# Patient Record
Sex: FEMALE | Race: WHITE | NOT HISPANIC OR LATINO | Employment: FULL TIME | URBAN - METROPOLITAN AREA
[De-identification: names, ages, dates, MRNs, and addresses within clinical notes are randomized per-mention and may not be internally consistent; named-entity substitution may affect disease eponyms.]

---

## 2017-02-14 ENCOUNTER — TRANSFERRED RECORDS (OUTPATIENT)
Dept: HEALTH INFORMATION MANAGEMENT | Facility: CLINIC | Age: 22
End: 2017-02-14

## 2017-02-14 LAB — PAP-ABSTRACT: NORMAL

## 2019-05-29 ENCOUNTER — OFFICE VISIT (OUTPATIENT)
Dept: FAMILY MEDICINE | Facility: CLINIC | Age: 24
End: 2019-05-29
Payer: COMMERCIAL

## 2019-05-29 VITALS
RESPIRATION RATE: 16 BRPM | SYSTOLIC BLOOD PRESSURE: 136 MMHG | DIASTOLIC BLOOD PRESSURE: 77 MMHG | OXYGEN SATURATION: 100 % | BODY MASS INDEX: 28.5 KG/M2 | HEIGHT: 69 IN | WEIGHT: 192.44 LBS | TEMPERATURE: 98.6 F | HEART RATE: 88 BPM

## 2019-05-29 DIAGNOSIS — Z00.00 ENCOUNTER FOR ROUTINE ADULT HEALTH EXAMINATION WITHOUT ABNORMAL FINDINGS: Primary | ICD-10-CM

## 2019-05-29 DIAGNOSIS — R23.2 FLUSHING: ICD-10-CM

## 2019-05-29 DIAGNOSIS — Z30.41 ENCOUNTER FOR SURVEILLANCE OF CONTRACEPTIVE PILLS: ICD-10-CM

## 2019-05-29 PROCEDURE — 87591 N.GONORRHOEAE DNA AMP PROB: CPT | Performed by: FAMILY MEDICINE

## 2019-05-29 PROCEDURE — G0145 SCR C/V CYTO,THINLAYER,RESCR: HCPCS | Performed by: FAMILY MEDICINE

## 2019-05-29 PROCEDURE — 99385 PREV VISIT NEW AGE 18-39: CPT | Performed by: FAMILY MEDICINE

## 2019-05-29 PROCEDURE — 87491 CHLMYD TRACH DNA AMP PROBE: CPT | Performed by: FAMILY MEDICINE

## 2019-05-29 RX ORDER — DROSPIRENONE AND ETHINYL ESTRADIOL 0.02-3(28)
1 KIT ORAL DAILY
Qty: 84 TABLET | Refills: 3 | Status: SHIPPED | OUTPATIENT
Start: 2019-05-29 | End: 2020-04-16

## 2019-05-29 RX ORDER — DESOGESTREL AND ETHINYL ESTRADIOL 0.15-0.03
1 KIT ORAL
COMMUNITY
Start: 2018-07-23 | End: 2019-05-29

## 2019-05-29 RX ORDER — PROPRANOLOL HYDROCHLORIDE 20 MG/1
20 TABLET ORAL 3 TIMES DAILY
COMMUNITY
End: 2019-05-29

## 2019-05-29 RX ORDER — PROPRANOLOL HYDROCHLORIDE 20 MG/1
20 TABLET ORAL 3 TIMES DAILY PRN
Qty: 30 TABLET | Refills: 5 | Status: SHIPPED | OUTPATIENT
Start: 2019-05-29 | End: 2019-09-30

## 2019-05-29 RX ORDER — GLYCOPYRROLATE 1 MG/1
1 TABLET ORAL 3 TIMES DAILY
COMMUNITY
End: 2020-08-18

## 2019-05-29 SDOH — HEALTH STABILITY: MENTAL HEALTH: HOW OFTEN DO YOU HAVE 6 OR MORE DRINKS ON ONE OCCASION?: NEVER

## 2019-05-29 SDOH — HEALTH STABILITY: MENTAL HEALTH: HOW MANY STANDARD DRINKS CONTAINING ALCOHOL DO YOU HAVE ON A TYPICAL DAY?: 1 OR 2

## 2019-05-29 SDOH — HEALTH STABILITY: MENTAL HEALTH: HOW OFTEN DO YOU HAVE A DRINK CONTAINING ALCOHOL?: 2-3 TIMES A WEEK

## 2019-05-29 SDOH — HEALTH STABILITY: MENTAL HEALTH: HOW MANY DRINKS CONTAINING ALCOHOL DO YOU HAVE ON A TYPICAL DAY WHEN YOU ARE DRINKING?: 1 OR 2

## 2019-05-29 SDOH — HEALTH STABILITY: MENTAL HEALTH: HOW OFTEN DO YOU HAVE SIX OR MORE DRINKS ON ONE OCCASION?: NEVER

## 2019-05-29 ASSESSMENT — MIFFLIN-ST. JEOR: SCORE: 1681.23

## 2019-05-29 ASSESSMENT — PAIN SCALES - GENERAL: PAINLEVEL: NO PAIN (0)

## 2019-05-29 NOTE — NURSING NOTE
"Chief Complaint   Patient presents with     Physical     Temp 98.6  F (37  C) (Oral)   Resp 16   Ht 1.759 m (5' 9.25\")   Wt 87.3 kg (192 lb 7 oz)   LMP 05/08/2019 (Approximate)   SpO2 100%   Breastfeeding? No   BMI 28.21 kg/m   Estimated body mass index is 28.21 kg/m  as calculated from the following:    Height as of this encounter: 1.759 m (5' 9.25\").    Weight as of this encounter: 87.3 kg (192 lb 7 oz).  bp completed using cuff size: regular      Health Maintenance addressed:  PAP done at [facility]- seen through care everywhere   Sent to abstract  All.THIERRY Tapia    n/a              "

## 2019-05-29 NOTE — PROGRESS NOTES
SUBJECTIVE:   CC: Benita Robbins is an 24 year old woman who presents for preventive health visit.     Healthy Habits:     Getting at least 3 servings of Calcium per day:  Yes    Bi-annual eye exam:  Yes    Dental care twice a year:  Yes    Sleep apnea or symptoms of sleep apnea:  None    Diet:  Gluten-free/reduced    Frequency of exercise:  6-7 days/week    Duration of exercise:  Greater than 60 minutes    Taking medications regularly:  Yes    Medication side effects:  None    PHQ-2 Total Score: 0    Additional concerns today:  Yes          -------------------------------------    Today's PHQ-2 Score:   PHQ-2 ( 1999 [employer]) 5/29/2019   Q1: Little interest or pleasure in doing things 0   Q2: Feeling down, depressed or hopeless 0   PHQ-2 Score 0   Q1: Little interest or pleasure in doing things Not at all   Q2: Feeling down, depressed or hopeless Not at all   PHQ-2 Score 0       Abuse: Current or Past(Physical, Sexual or Emotional)- No  Do you feel safe in your environment? Yes    Social History     Tobacco Use     Smoking status: Never Smoker     Smokeless tobacco: Never Used   Substance Use Topics     Alcohol use: Yes     Frequency: 2-3 times a week     Drinks per session: 1 or 2     Binge frequency: Never     Comment: soc- wine     If you drink alcohol do you typically have >3 drinks per day or >7 drinks per week? No    Alcohol Use 5/29/2019   Prescreen: >3 drinks/day or >7 drinks/week? No   Prescreen: >3 drinks/day or >7 drinks/week? -   No flowsheet data found.    Reviewed orders with patient.  Reviewed health maintenance and updated orders accordingly - Yes  There is no problem list on file for this patient.    Past Surgical History:   Procedure Laterality Date     DENTAL SURGERY      Fabius teeth extraction       Social History     Tobacco Use     Smoking status: Never Smoker     Smokeless tobacco: Never Used   Substance Use Topics     Alcohol use: Yes     Frequency: 2-3 times a week     Drinks per  "session: 1 or 2     Binge frequency: Never     Comment: soc- wine     Family History   Problem Relation Age of Onset     Alzheimer Disease Paternal Grandmother      Alcoholism Paternal Grandmother      Depression Paternal Grandmother      Mental Illness Paternal Grandmother      Coronary Artery Disease Paternal Grandfather      Alcoholism Paternal Grandfather            Mammogram not appropriate for this patient based on age.    Pertinent mammograms are reviewed under the imaging tab.  History of abnormal Pap smear: NO - age 21-29 PAP every 3 years recommended     Reviewed and updated as needed this visit by clinical staff  Tobacco  Allergies  Meds  Problems  Med Hx  Surg Hx  Fam Hx  Soc Hx          Reviewed and updated as needed this visit by Provider  Tobacco  Allergies  Meds  Problems  Med Hx  Surg Hx  Fam Hx            Review of Systems  CONSTITUTIONAL: NEGATIVE for fever, chills, change in weight  INTEGUMENTARU/SKIN: NEGATIVE for worrisome rashes, moles or lesions  EYES: NEGATIVE for vision changes or irritation  ENT: NEGATIVE for ear, mouth and throat problems  RESP: NEGATIVE for significant cough or SOB  BREAST: NEGATIVE for masses, tenderness or discharge  CV: NEGATIVE for chest pain, palpitations or peripheral edema  GI: NEGATIVE for nausea, abdominal pain, heartburn, or change in bowel habits  : NEGATIVE for unusual urinary or vaginal symptoms. Periods are regular.  MUSCULOSKELETAL: NEGATIVE for significant arthralgias or myalgia  NEURO: NEGATIVE for weakness, dizziness or paresthesias  PSYCHIATRIC: NEGATIVE for changes in mood or affect     OBJECTIVE:   /77 (BP Location: Right arm, Patient Position: Sitting, Cuff Size: Adult Regular)   Pulse 88   Temp 98.6  F (37  C) (Oral)   Resp 16   Ht 1.759 m (5' 9.25\")   Wt 87.3 kg (192 lb 7 oz)   LMP 05/08/2019 (Approximate)   SpO2 100%   Breastfeeding? No   BMI 28.21 kg/m    Physical Exam  GENERAL: healthy, alert and no " distress  EYES: Eyes grossly normal to inspection, PERRL and conjunctivae and sclerae normal  HENT: ear canals and TM's normal, nose and mouth without ulcers or lesions  NECK: no adenopathy, no asymmetry, masses, or scars and thyroid normal to palpation  RESP: lungs clear to auscultation - no rales, rhonchi or wheezes  BREAST: normal without masses, tenderness or nipple discharge and no palpable axillary masses or adenopathy  CV: regular rate and rhythm, normal S1 S2, no S3 or S4, no murmur, click or rub, no peripheral edema and peripheral pulses strong  ABDOMEN: soft, nontender, no hepatosplenomegaly, no masses and bowel sounds normal   (female): normal female external genitalia, normal urethral meatus, vaginal mucosa pink, moist, well rugated, and normal cervix/adnexa/uterus without masses or discharge  MS: no gross musculoskeletal defects noted, no edema  SKIN: no suspicious lesions or rashes  NEURO: Normal strength and tone, mentation intact and speech normal  PSYCH: mentation appears normal, affect normal/bright    Diagnostic Test Results:  none     ASSESSMENT/PLAN:   1. Encounter for routine adult health examination without abnormal findings  Routine screening  - drospirenone-ethinyl estradiol (MELISSA) 3-0.02 MG tablet; Take 1 tablet by mouth daily  Dispense: 84 tablet; Refill: 3  - Pap imaged thin layer screen reflex to HPV if ASCUS - recommend age 25 - 29  - NEISSERIA GONORRHOEA PCR  - CHLAMYDIA TRACHOMATIS PCR    2. Encounter for surveillance of contraceptive pills  Will switch her OCP -   When she picks up the OCP it is expensive  Plan to try Melissa generic  If not working will have her call to get previous OCP    3. Flushing  Will fill the inderal - uses just prn for flushing  - propranolol (INDERAL) 20 MG tablet; Take 1 tablet (20 mg) by mouth 3 times daily as needed (flushing)  Dispense: 30 tablet; Refill: 5    COUNSELING:  Reviewed preventive health counseling, as reflected in patient  "instructions    Estimated body mass index is 28.21 kg/m  as calculated from the following:    Height as of this encounter: 1.759 m (5' 9.25\").    Weight as of this encounter: 87.3 kg (192 lb 7 oz).    Weight management plan: Discussed healthy diet and exercise guidelines     reports that she has never smoked. She has never used smokeless tobacco.      Counseling Resources:  ATP IV Guidelines  Pooled Cohorts Equation Calculator  Breast Cancer Risk Calculator  FRAX Risk Assessment  ICSI Preventive Guidelines  Dietary Guidelines for Americans, 2010  USDA's MyPlate  ASA Prophylaxis  Lung CA Screening    Gin Anne, DO  [facility]  "

## 2019-05-31 LAB
C TRACH DNA SPEC QL NAA+PROBE: NEGATIVE
N GONORRHOEA DNA SPEC QL NAA+PROBE: NEGATIVE
SPECIMEN SOURCE: NORMAL
SPECIMEN SOURCE: NORMAL

## 2019-05-31 NOTE — RESULT ENCOUNTER NOTE
Dear Benita,   Your test results are all back -   -Chlamydia and gonnohrea tests are normal.  Let us know if you have any questions.  -Gin Anne, DO

## 2019-06-03 LAB
COPATH REPORT: NORMAL
PAP: NORMAL

## 2019-06-24 ENCOUNTER — E-VISIT (OUTPATIENT)
Dept: FAMILY MEDICINE | Facility: CLINIC | Age: 24
End: 2019-06-24
Payer: COMMERCIAL

## 2019-06-24 DIAGNOSIS — B37.31 CANDIDAL VULVOVAGINITIS: Primary | ICD-10-CM

## 2019-06-24 PROCEDURE — 99444 ZZC PHYSICIAN ONLINE EVALUATION & MANAGEMENT SERVICE: CPT | Performed by: FAMILY MEDICINE

## 2019-06-25 RX ORDER — FLUCONAZOLE 150 MG/1
150 TABLET ORAL ONCE
Qty: 1 TABLET | Refills: 0 | Status: SHIPPED | OUTPATIENT
Start: 2019-06-25 | End: 2019-06-25

## 2019-06-25 NOTE — PATIENT INSTRUCTIONS
Thank you for choosing us for your care. I have placed an order for a prescription so that you can start treatment. View your full visit summary for details by clicking on the link below. Your pharmacist will able to address any questions you may have about the medication.     If you re not feeling better within 2-3 days, please schedule an appointment.  You can schedule an appointment right here in [employer], or call [phone]  If the visit is for the same symptoms as your e-visit, we ll refund the cost of your e-visit if seen within seven days.      Yeast Infection (Candida Vaginal Infection)    You have a Candida vaginal infection. This is also known as a yeast infection. It is most often caused by a type of yeast (fungus) called Candida. Candida are normally found in the vagina. But if they increase in number, this can lead to infection and cause symptoms.  Symptoms of a yeast infection can include:    Clumpy or thin, white discharge, which may look like cottage cheese    Itching or burning    Burning with urination  Certain factors can make a yeast infection more likely. These can include:    Taking certain medicines, such as antibiotics or birth control pills    Pregnancy    Diabetes    Weak immune system  A yeast infection is most often treated with antifungal medicine. This may be given as a vaginal cream or pills you take by mouth. Treatment may last for about 1 to 7 days. Women with severe or recurrent infections may need longer courses of treatment.  Home care    If you re prescribed medicine, be sure to use it as directed. Finish all of the medicine, even if your symptoms go away. Note: Don t try to treat yourself using over-the-counter products without talking to your provider first. He or she will let you know if this is a good option for you.    Ask your provider what steps you can take to help reduce your risk of having a yeast infection in the future.  Follow-up care  Follow up with your  healthcare provider, or as directed.  When to seek medical advice  Call your healthcare provider right away if:    You have a fever of 100.4 F (38 C) or higher, or as directed by your provider.    Your symptoms worsen, or they don t go away within a few days of starting treatment.    You have new pain in the lower belly or pelvic region.    You have side effects that bother you or a reaction to the cream or pills you re prescribed.    You or any partners you have sex with have new symptoms, such as a rash, joint pain, or sores.  Date Last Reviewed: 10/1/2017    4005-5582 [employer]. [address] [place] [address]. All rights reserved. This information is not intended as a substitute for professional medical care. Always follow your healthcare professional's instructions.

## 2019-07-15 ENCOUNTER — E-VISIT (OUTPATIENT)
Dept: FAMILY MEDICINE | Facility: CLINIC | Age: 24
End: 2019-07-15
Payer: COMMERCIAL

## 2019-07-15 DIAGNOSIS — L30.9 DERMATITIS: Primary | ICD-10-CM

## 2019-07-15 PROCEDURE — 99444 ZZC PHYSICIAN ONLINE EVALUATION & MANAGEMENT SERVICE: CPT | Performed by: FAMILY MEDICINE

## 2019-07-15 RX ORDER — FLUOCINONIDE CREAM (EMULSIFIED BASE) 0.5 MG/G
CREAM TOPICAL
Qty: 30 G | Refills: 0 | Status: SHIPPED | OUTPATIENT
Start: 2019-07-15 | End: 2019-11-07

## 2019-08-28 ENCOUNTER — E-VISIT (OUTPATIENT)
Dept: FAMILY MEDICINE | Facility: CLINIC | Age: 24
End: 2019-08-28
Payer: COMMERCIAL

## 2019-08-28 DIAGNOSIS — L03.019 PARONYCHIA OF FINGER, UNSPECIFIED LATERALITY: Primary | ICD-10-CM

## 2019-08-28 PROCEDURE — 99444 ZZC PHYSICIAN ONLINE EVALUATION & MANAGEMENT SERVICE: CPT | Performed by: FAMILY MEDICINE

## 2019-08-28 RX ORDER — MUPIROCIN 20 MG/G
OINTMENT TOPICAL 3 TIMES DAILY
Qty: 30 G | Refills: 0 | Status: SHIPPED | OUTPATIENT
Start: 2019-08-28 | End: 2019-11-22

## 2019-08-28 RX ORDER — AMOXICILLIN AND CLAVULANATE POTASSIUM 500; 125 MG/1; MG/1
1 TABLET, FILM COATED ORAL 2 TIMES DAILY
Qty: 14 TABLET | Refills: 0 | Status: SHIPPED | OUTPATIENT
Start: 2019-08-28 | End: 2019-11-22

## 2019-08-28 NOTE — PATIENT INSTRUCTIONS
Patient Education     Paronychia of the Finger or Toe  Paronychia is an infection near a fingernail or toenail. It usually occurs when an opening in the cuticle or an ingrown toenail lets bacteria under the skin.  The infection will need to be drained if pus is present. If the infection has been caught early, you may need only antibiotic treatment. Healing will take about 1 to 2 weeks.  Home care  Follow these guidelines when caring for yourself at home:    Clean and soak the toe or finger. Do this 2 times a day for the first 3 days. To do so:  ? Soak your foot or hand in a tub of warm water for 5 minutes. Or hold your toe or finger under a faucet of warm running water for 5 minutes.  ? Clean any crust away with soap and water using a cotton swab.  ? Put antibiotic ointment on the infected area.    Change the dressing daily or any time it gets dirty.    If you were given antibiotics, take them as directed until they are all gone.    If your infection is on a toe, wear comfortable shoes with a lot of toe room. You can also wear open-toed sandals while your toe heals.    You may use over-the-counter medicine (acetaminophen or ibuprofen to help with pain, unless another medicine was prescribed. If you have chronic liver or kidney disease, talk with your healthcare provider before using these medicines. Also talk with your provider if you've had a stomach ulcer or GI (gastrointestinal) bleeding.  Prevention  The following can prevent paronychia:    Avoid cutting or playing with your cuticles at home.    Don't bite your nails.    Don't suck on your thumbs or fingers.  Follow-up care  Follow up with your healthcare provider, or as advised.  When to seek medical advice  Call your healthcare provider right away if any of these occur:    Redness, pain, or swelling of the finger or toe gets worse    Red streaks in the skin leading away from the wound    Pus or fluid draining from the nail area    Fever of 100.4 F (38 C) or  higher, or as directed by your provider  Date Last Reviewed: 8/1/2016 2000-2018 [employer]. [address] [place] [address], [place] [address]. All rights reserved. This information is not intended as a substitute for professional medical care. Always follow your healthcare professional's instructions.

## 2019-09-25 ENCOUNTER — E-VISIT (OUTPATIENT)
Dept: FAMILY MEDICINE | Facility: CLINIC | Age: 24
End: 2019-09-25

## 2019-09-25 DIAGNOSIS — N39.0 ACUTE UTI (URINARY TRACT INFECTION): Primary | ICD-10-CM

## 2019-09-25 PROCEDURE — 99444 ZZC PHYSICIAN ONLINE EVALUATION & MANAGEMENT SERVICE: CPT | Performed by: FAMILY MEDICINE

## 2019-09-25 RX ORDER — SULFAMETHOXAZOLE/TRIMETHOPRIM 800-160 MG
1 TABLET ORAL 2 TIMES DAILY
Qty: 6 TABLET | Refills: 0 | Status: SHIPPED | OUTPATIENT
Start: 2019-09-25 | End: 2019-09-28

## 2019-09-25 NOTE — PATIENT INSTRUCTIONS
Thank you for choosing us for your care. I have placed an order for a prescription so that you can start treatment. View your full visit summary for details by clicking on the link below. Your pharmacist will able to address any questions you may have about the medication.     If you re not feeling better within 2-3 days, please schedule an appointment.  You can schedule an appointment right here in [employer], or call [phone]  If the visit is for the same symptoms as your e-visit, we ll refund the cost of your e-visit if seen within seven days.      Urinary Tract Infections in Women    Urinary tract infections (UTIs) are most often caused by bacteria. These bacteria enter the urinary tract. The bacteria may come from outside the body. Or they may travel from the skin outside the rectum or vagina into the urethra. Female anatomy makes it easy for bacteria from the bowel to enter a woman s urinary tract, which is the most common source of UTI. This means women develop UTIs more often than men. Pain in or around the urinary tract is a common UTI symptom. But the only way to know for sure if you have a UTI for the healthcare provider to test your urine. The two tests that may be done are the urinalysis and urine culture.  Types of UTIs    Cystitis. A bladder infection (cystitis) is the most common UTI in women. You may have urgent or frequent urination. You may also have pain, burning when you urinate, and bloody urine.    Urethritis. This is an inflamed urethra, which is the tube that carries urine from the bladder to outside the body. You may have lower stomach or back pain. You may also have urgent or frequent urination.    Pyelonephritis. This is a kidney infection. If not treated, it can be serious and damage your kidneys. In severe cases, you may need to stay in the hospital. You may have a fever and lower back pain.  Medicines to treat a UTI  Most UTIs are treated with antibiotics. These kill the bacteria.  The length of time you need to take them depends on the type of infection. It may be as short as 3 days. If you have repeated UTIs, you may need a low-dose antibiotic for several months. Take antibiotics exactly as directed. Don t stop taking them until all of the medicine is gone. If you stop taking the antibiotic too soon, the infection may not go away. You may also develop a resistance to the antibiotic. This can make it much harder to treat.  Lifestyle changes to treat and prevent UTIs  The lifestyle changes below will help get rid of your UTI. They may also help prevent future UTIs.    Drink plenty of fluids. This includes water, juice, or other caffeine-free drinks. Fluids help flush bacteria out of your body.    Empty your bladder. Always empty your bladder when you feel the urge to urinate. And always urinate before going to sleep. Urine that stays in your bladder can lead to infection. Try to urinate before and after sex as well.    Practice good personal hygiene. Wipe yourself from front to back after using the toilet. This helps keep bacteria from getting into the urethra.    Use condoms during sex. These help prevent UTIs caused by sexually transmitted bacteria. Also don't use spermicides during sex. These can increase the risk for UTIs. Choose other forms of birth control instead. For women who tend to get UTIs after sex, a low-dose of a preventive antibiotic may be used. Be sure to discuss this option with your healthcare provider.    Follow up with your healthcare provider as directed. He or she may test to make sure the infection has cleared. If needed, more treatment may be started.  Date Last Reviewed: 1/1/2017 2000-2018 [employer]. [address] [place] [address], [place] [address]. All rights reserved. This information is not intended as a substitute for professional medical care. Always follow your healthcare professional's instructions.

## 2019-09-30 DIAGNOSIS — R23.2 FLUSHING: ICD-10-CM

## 2019-09-30 NOTE — TELEPHONE ENCOUNTER
"Routing refill request to provider for review/approval because:  Drug not on the [facility] refill protocol for dx of flushing  Mona CRAFT RN    Last Written Prescription Date:  5/29/2019  Last Fill Quantity: 30,  # refills: 5   Last office visit: 5/29/2019 with prescribing provider:     Future Office Visit:   Next 5 appointments (look out 90 days)    Oct 29, 2019  7:45 AM CDT  Office Visit with Gin Anne DO  [facility] ([facility] [address] [id number] [phone]         Requested Prescriptions   Pending Prescriptions Disp Refills     propranolol (INDERAL) 20 MG tablet [Pharmacy Med Name: PROPRANOLOL 20MG TABLETS] 30 tablet 0     Sig: TAKE 1 TABLET BY MOUTH THREE TIMES DAILY AS NEEDED(FLUSHING)       Beta-Blockers Protocol Passed - 9/30/2019  8:57 AM        Passed - Blood pressure under 140/90 in past 12 months     BP Readings from Last 3 Encounters:   05/29/19 136/77                 Passed - Patient is age 6 or older        Passed - Recent (12 mo) or future (30 days) visit within the authorizing provider's specialty     Patient has had an office visit with the authorizing provider or a provider within the authorizing providers department within the previous 12 mos or has a future within next 30 days. See \"Patient Info\" tab in inbasket, or \"Choose Columns\" in Meds & Orders section of the refill encounter.              Passed - Medication is active on med list        "

## 2019-10-01 RX ORDER — PROPRANOLOL HYDROCHLORIDE 20 MG/1
TABLET ORAL
Qty: 30 TABLET | Refills: 0 | Status: SHIPPED | OUTPATIENT
Start: 2019-10-01 | End: 2019-10-08

## 2019-10-31 DIAGNOSIS — R23.2 FLUSHING: ICD-10-CM

## 2019-10-31 RX ORDER — PROPRANOLOL HYDROCHLORIDE 20 MG/1
TABLET ORAL
Qty: 30 TABLET | Refills: 0 | Status: SHIPPED | OUTPATIENT
Start: 2019-10-31 | End: 2020-08-18 | Stop reason: DRUGHIGH

## 2019-10-31 NOTE — TELEPHONE ENCOUNTER
"Inderal  Last Written Prescription Date:    Last Fill Quantity: 30,  # refills: 0   Last office visit: 5/29/2019 with prescribing provider:  yes   Future Office Visit:   Next 5 appointments (look out 90 days)    Nov 22, 2019  3:45 PM CST  Office Visit with Gin Anne DO  [facility] ([facility]) [address]  [facility] [address] [phone]         Requested Prescriptions   Pending Prescriptions Disp Refills     propranolol (INDERAL) 20 MG tablet [Pharmacy Med Name: PROPRANOLOL 20MG TABLETS] 30 tablet 0     Sig: TAKE 1 TABLET BY MOUTH THREE TIMES DAILY AS NEEDED(FLUSHING)       Beta-Blockers Protocol Passed - 10/31/2019  8:49 AM        Passed - Blood pressure under 140/90 in past 12 months     BP Readings from Last 3 Encounters:   05/29/19 136/77                 Passed - Patient is age 6 or older        Passed - Recent (12 mo) or future (30 days) visit within the authorizing provider's specialty     Patient has had an office visit with the authorizing provider or a provider within the authorizing providers department within the previous 12 mos or has a future within next 30 days. See \"Patient Info\" tab in inbasket, or \"Choose Columns\" in Meds & Orders section of the refill encounter.              Passed - Medication is active on med list          "

## 2019-10-31 NOTE — TELEPHONE ENCOUNTER
Prescription approved per [facility] Refill Protocol.  Mona CRAFT RN

## 2019-11-07 DIAGNOSIS — L30.9 DERMATITIS: ICD-10-CM

## 2019-11-08 RX ORDER — FLUOCINONIDE CREAM (EMULSIFIED BASE) 0.5 MG/G
CREAM TOPICAL
Qty: 30 G | Refills: 0 | Status: SHIPPED | OUTPATIENT
Start: 2019-11-08 | End: 2020-08-18

## 2019-11-08 NOTE — TELEPHONE ENCOUNTER
Routing refill request to provider for review/approval because:  Drug not on the [facility] refill protocol   Mona CRAFT RN

## 2019-11-08 NOTE — TELEPHONE ENCOUNTER
Last Written Prescription Date:  7/15/2019  Last Fill Quantity: 30,  # refills: 0   Last office visit: 5/29/2019 with prescribing provider:  Dayana   Future Office Visit:   Next 5 appointments (look out 90 days)    Nov 22, 2019  3:45 PM CST  Office Visit with Gin Anne DO  [facility] ([facility]) [address] [facility] [address] [phone]           Requested Prescriptions   Pending Prescriptions Disp Refills     fluocinonide emulsified base 0.05 % external cream [Pharmacy Med Name: FLUOCINONIDE -E 0.05% CREAM 15GM] 30 g 0     Sig: APPLY EXTERNALLY TO RASH TWICE DAILY       There is no refill protocol information for this order

## 2019-11-22 ENCOUNTER — OFFICE VISIT (OUTPATIENT)
Dept: FAMILY MEDICINE | Facility: CLINIC | Age: 24
End: 2019-11-22
Payer: COMMERCIAL

## 2019-11-22 VITALS
BODY MASS INDEX: 29.64 KG/M2 | WEIGHT: 200.1 LBS | SYSTOLIC BLOOD PRESSURE: 130 MMHG | TEMPERATURE: 98 F | OXYGEN SATURATION: 97 % | HEART RATE: 91 BPM | HEIGHT: 69 IN | RESPIRATION RATE: 12 BRPM | DIASTOLIC BLOOD PRESSURE: 85 MMHG

## 2019-11-22 DIAGNOSIS — Z23 FLU VACCINE NEED: ICD-10-CM

## 2019-11-22 DIAGNOSIS — F41.1 GENERALIZED ANXIETY DISORDER: Primary | ICD-10-CM

## 2019-11-22 DIAGNOSIS — R23.2 FLUSHING: ICD-10-CM

## 2019-11-22 PROCEDURE — 90686 IIV4 VACC NO PRSV 0.5 ML IM: CPT | Performed by: FAMILY MEDICINE

## 2019-11-22 PROCEDURE — 90471 IMMUNIZATION ADMIN: CPT | Performed by: FAMILY MEDICINE

## 2019-11-22 PROCEDURE — 96127 BRIEF EMOTIONAL/BEHAV ASSMT: CPT | Performed by: FAMILY MEDICINE

## 2019-11-22 PROCEDURE — 99214 OFFICE O/P EST MOD 30 MIN: CPT | Mod: 25 | Performed by: FAMILY MEDICINE

## 2019-11-22 RX ORDER — ESCITALOPRAM OXALATE 10 MG/1
TABLET ORAL
Qty: 30 TABLET | Refills: 1 | Status: SHIPPED | OUTPATIENT
Start: 2019-11-22 | End: 2020-01-03

## 2019-11-22 RX ORDER — PROPRANOLOL HYDROCHLORIDE 40 MG/1
40 TABLET ORAL 2 TIMES DAILY PRN
Qty: 40 TABLET | Refills: 3 | Status: SHIPPED | OUTPATIENT
Start: 2019-11-22 | End: 2021-05-12

## 2019-11-22 ASSESSMENT — ANXIETY QUESTIONNAIRES
6. BECOMING EASILY ANNOYED OR IRRITABLE: SEVERAL DAYS
2. NOT BEING ABLE TO STOP OR CONTROL WORRYING: NEARLY EVERY DAY
7. FEELING AFRAID AS IF SOMETHING AWFUL MIGHT HAPPEN: MORE THAN HALF THE DAYS
5. BEING SO RESTLESS THAT IT IS HARD TO SIT STILL: NOT AT ALL
IF YOU CHECKED OFF ANY PROBLEMS ON THIS QUESTIONNAIRE, HOW DIFFICULT HAVE THESE PROBLEMS MADE IT FOR YOU TO DO YOUR WORK, TAKE CARE OF THINGS AT HOME, OR GET ALONG WITH OTHER PEOPLE: VERY DIFFICULT
GAD7 TOTAL SCORE: 12
3. WORRYING TOO MUCH ABOUT DIFFERENT THINGS: MORE THAN HALF THE DAYS
1. FEELING NERVOUS, ANXIOUS, OR ON EDGE: NEARLY EVERY DAY

## 2019-11-22 ASSESSMENT — PATIENT HEALTH QUESTIONNAIRE - PHQ9: 5. POOR APPETITE OR OVEREATING: SEVERAL DAYS

## 2019-11-22 ASSESSMENT — MIFFLIN-ST. JEOR: SCORE: 1715.99

## 2019-11-22 NOTE — NURSING NOTE
"Chief Complaint   Patient presents with     Anxiety     /85   Pulse 91   Temp 98  F (36.7  C) (Oral)   Resp 12   Ht 1.759 m (5' 9.25\")   Wt 90.8 kg (200 lb 1.6 oz)   LMP 11/08/2019 (Approximate)   SpO2 97%   BMI 29.34 kg/m   Estimated body mass index is 29.34 kg/m  as calculated from the following:    Height as of this encounter: 1.759 m (5' 9.25\").    Weight as of this encounter: 90.8 kg (200 lb 1.6 oz).  bp completed using cuff size: regular      Health Maintenance addressed:  NONE    n/a    Tete Ponce MA    "

## 2019-11-22 NOTE — PROGRESS NOTES
Subjective     Benita Robbins is a 24 year old female who presents to clinic today for the following health issues:    HPI   Anxiety Follow-Up    How are you doing with your anxiety since your last visit? Worsened     Are you having other symptoms that might be associated with anxiety? Yes:  drowsiness    Have you had a significant life event? new Job Concerns     Are you feeling depressed? No    Do you have any concerns with your use of alcohol or other drugs? No     Working with therapist - difficult to get to appts    In AM mind over thinks - ruminates  Seems more related to just work  Family hx of anxiety - mother     Taking propranolol 40mg BID most work days -   Only as needed -      Social History     Tobacco Use     Smoking status: Never Smoker     Smokeless tobacco: Never Used   Substance Use Topics     Alcohol use: Yes     Frequency: 2-3 times a week     Drinks per session: 1 or 2     Binge frequency: Never     Comment: soc- wine     Drug use: Never     MOUNA-7 SCORE 11/22/2019   Total Score 12     No flowsheet data found.  MOUNA-7  11/22/2019   1. Feeling nervous, anxious, or on edge 3   2. Not being able to stop or control worrying 3   3. Worrying too much about different things 2   4. Trouble relaxing 1   5. Being so restless that it is hard to sit still 0   6. Becoming easily annoyed or irritable 1   7. Feeling afraid, as if something awful might happen 2   MOUNA-7 Total Score 12   If you checked any problems, how difficult have they made it for you to do your work, take care of things at home, or get along with other people? Very difficult         How many servings of fruits and vegetables do you eat daily?  2-3    On average, how many sweetened beverages do you drink each day (soda, juice, sweet tea, etc)?   0-1  How many days per week do you miss taking your medication? Patient takes Meds as needed    What makes it hard for you to take your medications?   "none    -------------------------------------    There is no problem list on file for this patient.    Past Surgical History:   Procedure Laterality Date     DENTAL SURGERY      [place] teeth extraction       Social History     Tobacco Use     Smoking status: Never Smoker     Smokeless tobacco: Never Used   Substance Use Topics     Alcohol use: Yes     Frequency: 2-3 times a week     Drinks per session: 1 or 2     Binge frequency: Never     Comment: soc- wine     Family History   Problem Relation Age of Onset     Alzheimer Disease Paternal Grandmother      Alcoholism Paternal Grandmother      Depression Paternal Grandmother      Mental Illness Paternal Grandmother      Coronary Artery Disease Paternal Grandfather      Alcoholism Paternal Grandfather            Reviewed and updated as needed this visit by Provider  Tobacco  Allergies  Meds  Problems  Med Hx  Surg Hx  Fam Hx         Review of Systems   ROS COMP: Constitutional, HEENT, cardiovascular, pulmonary, GI, , musculoskeletal, neuro, skin, endocrine and psych systems are negative, except as otherwise noted.      Objective    /85   Pulse 91   Temp 98  F (36.7  C) (Oral)   Resp 12   Ht 1.759 m (5' 9.25\")   Wt 90.8 kg (200 lb 1.6 oz)   LMP 11/08/2019 (Approximate)   SpO2 97%   BMI 29.34 kg/m    Body mass index is 29.34 kg/m .  Physical Exam   GENERAL: healthy, alert and no distress  NECK: no adenopathy, no asymmetry, masses, or scars and thyroid normal to palpation  RESP: lungs clear to auscultation - no rales, rhonchi or wheezes  CV: regular rate and rhythm, normal S1 S2, no S3 or S4, no murmur, click or rub, no peripheral edema and peripheral pulses strong    Diagnostic Test Results:  Labs reviewed in Epic        Assessment & Plan     1. Generalized anxiety disorder  Worsening general anxiety   Mostly work week related -  Discussed starting daily med- risk vs benefits  Will try lexapro and see how she does  Pt will call or RTC if symptoms " "worsen or do not improve.  F/u e visit in 4-6 weeks  - escitalopram (LEXAPRO) 10 MG tablet; Start 1/2 tablet daily for 1-2 weeks, then take one tablet daily  Dispense: 30 tablet; Refill: 1    2. [place]  Refilled for prn flushing with anxiety  - propranolol (INDERAL) 40 MG tablet; Take 1 tablet (40 mg) by mouth 2 times daily as needed (anxiety)  Dispense: 40 tablet; Refill: 3    3. Flu vaccine need  Given   -  FLU VAC PRESRV FREE QUAD SPLIT VIR > 6 MONTHS IM     BMI:   Estimated body mass index is 29.34 kg/m  as calculated from the following:    Height as of this encounter: 1.759 m (5' 9.25\").    Weight as of this encounter: 90.8 kg (200 lb 1.6 oz).           Pt will call or RTC if symptoms worsen or do not improve.      Return in about 4 weeks (around 12/20/2019) for evisit for anxiety .    Gin Anne,   [facility]    "

## 2019-11-23 ASSESSMENT — ANXIETY QUESTIONNAIRES: GAD7 TOTAL SCORE: 12

## 2020-01-02 ENCOUNTER — E-VISIT (OUTPATIENT)
Dept: FAMILY MEDICINE | Facility: CLINIC | Age: 25
End: 2020-01-02
Payer: COMMERCIAL

## 2020-01-02 DIAGNOSIS — F41.1 GENERALIZED ANXIETY DISORDER: ICD-10-CM

## 2020-01-02 PROCEDURE — 99421 OL DIG E/M SVC 5-10 MIN: CPT | Performed by: FAMILY MEDICINE

## 2020-01-02 ASSESSMENT — ANXIETY QUESTIONNAIRES
GAD7 TOTAL SCORE: 7
1. FEELING NERVOUS, ANXIOUS, OR ON EDGE: SEVERAL DAYS
3. WORRYING TOO MUCH ABOUT DIFFERENT THINGS: SEVERAL DAYS
5. BEING SO RESTLESS THAT IT IS HARD TO SIT STILL: SEVERAL DAYS
4. TROUBLE RELAXING: SEVERAL DAYS
7. FEELING AFRAID AS IF SOMETHING AWFUL MIGHT HAPPEN: SEVERAL DAYS
GAD7 TOTAL SCORE: 7
2. NOT BEING ABLE TO STOP OR CONTROL WORRYING: SEVERAL DAYS
7. FEELING AFRAID AS IF SOMETHING AWFUL MIGHT HAPPEN: SEVERAL DAYS
6. BECOMING EASILY ANNOYED OR IRRITABLE: SEVERAL DAYS

## 2020-01-03 RX ORDER — ESCITALOPRAM OXALATE 10 MG/1
TABLET ORAL
Qty: 60 TABLET | Refills: 1 | Status: SHIPPED | OUTPATIENT
Start: 2020-01-03 | End: 2020-03-17

## 2020-01-03 ASSESSMENT — ANXIETY QUESTIONNAIRES: GAD7 TOTAL SCORE: 7

## 2020-03-16 ENCOUNTER — E-VISIT (OUTPATIENT)
Dept: FAMILY MEDICINE | Facility: CLINIC | Age: 25
End: 2020-03-16
Payer: COMMERCIAL

## 2020-03-16 DIAGNOSIS — B00.1 COLD SORE: Primary | ICD-10-CM

## 2020-03-16 DIAGNOSIS — F41.1 GENERALIZED ANXIETY DISORDER: ICD-10-CM

## 2020-03-16 PROCEDURE — 99422 OL DIG E/M SVC 11-20 MIN: CPT | Performed by: FAMILY MEDICINE

## 2020-03-16 RX ORDER — VALACYCLOVIR HYDROCHLORIDE 1 G/1
2000 TABLET, FILM COATED ORAL 2 TIMES DAILY
Qty: 4 TABLET | Refills: 5 | Status: SHIPPED | OUTPATIENT
Start: 2020-03-16 | End: 2021-05-11

## 2020-03-17 RX ORDER — ESCITALOPRAM OXALATE 20 MG/1
20 TABLET ORAL DAILY
Qty: 90 TABLET | Refills: 1 | Status: SHIPPED | OUTPATIENT
Start: 2020-03-17 | End: 2020-08-18

## 2020-03-29 ENCOUNTER — E-VISIT (OUTPATIENT)
Dept: FAMILY MEDICINE | Facility: CLINIC | Age: 25
End: 2020-03-29
Payer: COMMERCIAL

## 2020-03-29 DIAGNOSIS — L30.9 DERMATITIS: Primary | ICD-10-CM

## 2020-03-29 PROCEDURE — 99207 ZZC NON-BILLABLE SERV PER CHARTING: CPT | Performed by: FAMILY MEDICINE

## 2020-04-07 ENCOUNTER — E-VISIT (OUTPATIENT)
Dept: FAMILY MEDICINE | Facility: CLINIC | Age: 25
End: 2020-04-07
Payer: COMMERCIAL

## 2020-04-07 ENCOUNTER — MYC MEDICAL ADVICE (OUTPATIENT)
Dept: FAMILY MEDICINE | Facility: CLINIC | Age: 25
End: 2020-04-07

## 2020-04-07 DIAGNOSIS — J32.9 SINUSITIS, UNSPECIFIED CHRONICITY, UNSPECIFIED LOCATION: Primary | ICD-10-CM

## 2020-04-07 PROCEDURE — 99421 OL DIG E/M SVC 5-10 MIN: CPT | Performed by: FAMILY MEDICINE

## 2020-04-08 RX ORDER — AMOXICILLIN 875 MG
875 TABLET ORAL 2 TIMES DAILY
Qty: 20 TABLET | Refills: 0 | Status: SHIPPED | OUTPATIENT
Start: 2020-04-08 | End: 2020-05-15

## 2020-04-08 NOTE — PATIENT INSTRUCTIONS
Thank you for choosing us for your care. I have placed an order for a prescription so that you can start treatment. View your full visit summary for details by clicking on the link below. Your pharmacist will able to address any questions you may have about the medication.     If you're not feeling better within 5-7 days, please schedule an appointment.  You can schedule an appointment right here in [facility], or call [phone]  If the visit is for the same symptoms as your e-visit, we'll refund the cost of your e-visit if seen within seven days.      Sinusitis (Antibiotic Treatment)    The sinuses are air-filled spaces within the bones of the face. They connect to the inside of the nose. Sinusitis is an inflammation of the tissue that lines the sinuses. Sinusitis can occur during a cold. It can also happen due to allergies to pollens and other particles in the air. Sinusitis can cause symptoms of sinus congestion and a feeling of fullness. A sinus infection causes fever, headache, and facial pain. There is often green or yellow fluid draining from the nose or into the back of the throat (post-nasal drip). You have been given antibiotics to treat this condition.  Home care    Take the full course of antibiotics as instructed. Do not stop taking them, even when you feel better.    Drink plenty of water, hot tea, and other liquids. This may help thin nasal mucus. It also may help your sinuses drain fluids.    Heat may help soothe painful areas of your face. Use a towel soaked in hot water. Or,  the shower and direct the warm spray onto your face. Using a vaporizer along with a menthol rub at night may also help soothe symptoms.     An expectorant with guaifenesin may help thin nasal mucus and help your sinuses drain fluids.    You can use an over-the-counter decongestant, unless a similar medicine was prescribed to you. Nasal sprays work the fastest. Use one that contains phenylephrine or oxymetazoline.  First blow your nose gently. Then use the spray. Do not use these medicines more often than directed on the label. If you do, your symptoms may get worse. You may also take pills that contain pseudoephedrine. Don t use products that combine multiple medicines. This is because side effects may be increased. Read labels. You can also ask the pharmacist for help. (People with high blood pressure should not use decongestants. They can raise blood pressure.)    Over-the-counter antihistamines may help if allergies contributed to your sinusitis.      Do not use nasal rinses or irrigation during an acute sinus infection, unless your healthcare provider tells you to. Rinsing may spread the infection to other areas in your sinuses.    Use acetaminophen or ibuprofen to control pain, unless another pain medicine was prescribed to you. If you have chronic liver or kidney disease or ever had a stomach ulcer, talk with your healthcare provider before using these medicines. (Aspirin should never be taken by anyone under age 18 who is ill with a fever. It may cause severe liver damage.)    Don't smoke. This can make symptoms worse.  Follow-up care  Follow up with your healthcare provider or our staff if you are not better in 1 week.  When to seek medical advice  Call your healthcare provider if any of these occur:    Facial pain or headache that gets worse    Stiff neck    Unusual drowsiness or confusion    Swelling of your forehead or eyelids    Vision problems, such as blurred or double vision    Fever of 100.4 F (38 C) or higher, or as directed by your healthcare provider    Seizure    Breathing problems    Symptoms don't go away in 10 days  Prevention  Here are steps you can take to help prevent an infection:    Keep good hand washing habits.    Don t have close contact with people who have sore throats, colds, or other upper respiratory infections.    Don t smoke, and stay away from secondhand smoke.    Stay up to date with of  your vaccines.  Date Last Reviewed: 11/1/2017 2000-2019 [employer]. [address] [place] [address], [place] [address]. All rights reserved. This information is not intended as a substitute for professional medical care. Always follow your healthcare professional's instructions.

## 2020-04-16 DIAGNOSIS — Z00.00 ENCOUNTER FOR ROUTINE ADULT HEALTH EXAMINATION WITHOUT ABNORMAL FINDINGS: ICD-10-CM

## 2020-04-16 RX ORDER — DROSPIRENONE AND ETHINYL ESTRADIOL 0.02-3(28)
1 KIT ORAL DAILY
Qty: 84 TABLET | Refills: 0 | Status: SHIPPED | OUTPATIENT
Start: 2020-04-16 | End: 2020-07-10

## 2020-04-16 NOTE — TELEPHONE ENCOUNTER
"Requested Prescriptions   Pending Prescriptions Disp Refills     drospirenone-ethinyl estradiol (MELISSA) 3-0.02 MG tablet [Pharmacy Med Name: DROSPIRENONE/ETHY EST 3/0.02MG T 28]  Last Written Prescription Date:  5/29/2019  Last Fill Quantity: 84 tablet,  # refills: 3   Last office visit: 11/22/2019 with prescribing provider:  Dayana   Future Office Visit:   Next 5 appointments (look out 90 days)    Jun 03, 2020  3:45 PM CDT  MyChart Physical Adult with Gin Anne DO  [facility] [address] [facility] [address] [phone]          84 tablet 3     Sig: TAKE 1 TABLET BY MOUTH DAILY       Contraceptives Protocol Passed - 4/16/2020  9:50 AM        Passed - Patient is not a current smoker if age is 35 or older        Passed - Recent (12 mo) or future (30 days) visit within the authorizing provider's specialty     Patient has had an office visit with the authorizing provider or a provider within the authorizing providers department within the previous 12 mos or has a future within next 30 days. See \"Patient Info\" tab in inbasket, or \"Choose Columns\" in Meds & Orders section of the refill encounter.              Passed - Medication is active on med list        Passed - No active pregnancy on record        Passed - No positive pregnancy test in past 12 months              "

## 2020-04-16 NOTE — TELEPHONE ENCOUNTER
Prescription approved per [facility] [place] [facility] [place].  Mona CRAFT RN

## 2020-05-15 ENCOUNTER — VIRTUAL VISIT (OUTPATIENT)
Dept: FAMILY MEDICINE | Facility: CLINIC | Age: 25
End: 2020-05-15
Payer: COMMERCIAL

## 2020-05-15 DIAGNOSIS — F41.1 GENERALIZED ANXIETY DISORDER: Primary | ICD-10-CM

## 2020-05-15 PROCEDURE — 99214 OFFICE O/P EST MOD 30 MIN: CPT | Mod: 95 | Performed by: FAMILY MEDICINE

## 2020-05-15 RX ORDER — BUPROPION HYDROCHLORIDE 100 MG/1
TABLET, EXTENDED RELEASE ORAL
Qty: 60 TABLET | Refills: 1 | Status: SHIPPED | OUTPATIENT
Start: 2020-05-15 | End: 2020-08-18

## 2020-05-15 ASSESSMENT — ANXIETY QUESTIONNAIRES
3. WORRYING TOO MUCH ABOUT DIFFERENT THINGS: NEARLY EVERY DAY
5. BEING SO RESTLESS THAT IT IS HARD TO SIT STILL: NEARLY EVERY DAY
7. FEELING AFRAID AS IF SOMETHING AWFUL MIGHT HAPPEN: SEVERAL DAYS
1. FEELING NERVOUS, ANXIOUS, OR ON EDGE: NEARLY EVERY DAY
GAD7 TOTAL SCORE: 19
6. BECOMING EASILY ANNOYED OR IRRITABLE: NEARLY EVERY DAY
IF YOU CHECKED OFF ANY PROBLEMS ON THIS QUESTIONNAIRE, HOW DIFFICULT HAVE THESE PROBLEMS MADE IT FOR YOU TO DO YOUR WORK, TAKE CARE OF THINGS AT HOME, OR GET ALONG WITH OTHER PEOPLE: EXTREMELY DIFFICULT
2. NOT BEING ABLE TO STOP OR CONTROL WORRYING: NEARLY EVERY DAY

## 2020-05-15 ASSESSMENT — PATIENT HEALTH QUESTIONNAIRE - PHQ9
SUM OF ALL RESPONSES TO PHQ QUESTIONS 1-9: 14
5. POOR APPETITE OR OVEREATING: NEARLY EVERY DAY

## 2020-05-15 NOTE — NURSING NOTE
"Chief Complaint   Patient presents with     Anxiety     initial There were no vitals taken for this visit. Estimated body mass index is 29.34 kg/m  as calculated from the following:    Height as of 11/22/19: 1.759 m (5' 9.25\").    Weight as of 11/22/19: 90.8 kg (200 lb 1.6 oz).  BP completed using cuff size: NA (Not Taken).  L  R arm      Health Maintenance that is potentially due pending provider review:    Bonilla Sanchez ma    "

## 2020-05-15 NOTE — PROGRESS NOTES
"Benita Robbins is a 25 year old female who is being evaluated via a billable video visit.      The patient has been notified of following:     \"This video visit will be conducted via a call between you and your physician/provider. We have found that certain health care needs can be provided without the need for an in-person physical exam.  This service lets us provide the care you need with a video conversation.  If a prescription is necessary we can send it directly to your pharmacy.  If lab work is needed we can place an order for that and you can then stop by our lab to have the test done at a later time.    Video visits are billed at different rates depending on your insurance coverage.  Please reach out to your insurance provider with any questions.    If during the course of the call the physician/provider feels a video visit is not appropriate, you will not be charged for this service.\"    Patient has given verbal consent for Video visit? Yes    How would you like to obtain your AVS? CharlieStarr    Patient would like the video invitation sent by: Text to cell phone: [phone]    Will anyone else be joining your video visit? No     Subjective     Benita Robbins is a 25 year old female who presents today via video visit for the following health issues:    HPI  Anxiety Follow-Up    How are you doing with your anxiety since your last visit?     Are you having other symptoms that might be associated with anxiety?     Have you had a significant life event?      Are you feeling depressed?     Do you have any concerns with your use of alcohol or other drugs? No    Social History     Tobacco Use     Smoking status: Never Smoker     Smokeless tobacco: Never Used   Substance Use Topics     Alcohol use: Yes     Frequency: 2-3 times a week     Drinks per session: 1 or 2     Binge frequency: Never     Comment: soc- wine     Drug use: Never     MOUNA-7 SCORE 11/22/2019 1/2/2020   Total Score - 7 (mild anxiety)   Total " Score 12 7     No flowsheet data found.  Last PHQ-9 5/15/2020   1.  Little interest or pleasure in doing things 1   2.  Feeling down, depressed, or hopeless 1   3.  Trouble falling or staying asleep, or sleeping too much 3   4.  Feeling tired or having little energy 1   5.  Poor appetite or overeating 1   6.  Feeling bad about yourself 1   7.  Trouble concentrating 3   8.  Moving slowly or restless 3   Q9: Thoughts of better off dead/self-harm past 2 weeks 0   PHQ-9 Total Score 14     MOUNA-7  5/15/2020   1. Feeling nervous, anxious, or on edge 3   2. Not being able to stop or control worrying 3   3. Worrying too much about different things 3   4. Trouble relaxing 3   5. Being so restless that it is hard to sit still 3   6. Becoming easily annoyed or irritable 3   7. Feeling afraid, as if something awful might happen 1   MOUNA-7 Total Score 19   If you checked any problems, how difficult have they made it for you to do your work, take care of things at home, or get along with other people? Extremely difficult         How many days per week do you exercise enough to make your heart beat faster? 7    How many minutes a day do you exercise enough to make your heart beat faster? 30 - 60    How many days per week do you miss taking your medication? 0        Video Start Time: 11:01am    -------------------------------------    There is no problem list on file for this patient.    Past Surgical History:   Procedure Laterality Date     DENTAL SURGERY      [place] teeth extraction       Social History     Tobacco Use     Smoking status: Never Smoker     Smokeless tobacco: Never Used   Substance Use Topics     Alcohol use: Yes     Frequency: 2-3 times a week     Drinks per session: 1 or 2     Binge frequency: Never     Comment: soc- wine     Family History   Problem Relation Age of Onset     Alzheimer Disease Paternal Grandmother      Alcoholism Paternal Grandmother      Depression Paternal Grandmother      Mental Illness Paternal  "Grandmother      Coronary Artery Disease Paternal Grandfather      Alcoholism Paternal Grandfather            Reviewed and updated as needed this visit by Provider         Review of Systems   Constitutional, HEENT, cardiovascular, pulmonary, gi and gu systems are negative, except as otherwise noted.      Objective    There were no vitals taken for this visit.  Estimated body mass index is 29.34 kg/m  as calculated from the following:    Height as of 11/22/19: 1.759 m (5' 9.25\").    Weight as of 11/22/19: 90.8 kg (200 lb 1.6 oz).  Physical Exam     GENERAL: Healthy, alert and no distress  EYES: Eyes grossly normal to inspection.  No discharge or erythema, or obvious scleral/conjunctival abnormalities.  RESP: No audible wheeze, cough, or visible cyanosis.  No visible retractions or increased work of breathing.    SKIN: Visible skin clear. No significant rash, abnormal pigmentation or lesions.  NEURO: Cranial nerves grossly intact.  Mentation and speech appropriate for age.  PSYCH: Mentation appears normal, affect normal/bright, judgement and insight intact, normal speech and appearance well-groomed.      Diagnostic Test Results:  Labs reviewed in Epic        Assessment & Plan     1. Generalized anxiety disorder  Worsening anxiety - trouble with focus  Discussed option of keeping the escitalopram going at the current dose   Will add wellbutrin once daily for a week then go up to one twice a day  May take 3-5 weeks to see max benefit  Pt will call or RTC if symptoms worsen or do not improve.   - buPROPion (WELLBUTRIN SR) 100 MG 12 hr tablet; Start one tablet daily for 1 week then increase to one tablet twice day  Dispense: 60 tablet; Refill: 1      Pt will call or RTC if symptoms worsen or do not improve.      No follow-ups on file.    Gin Anne DO  [facility]      Video-Visit Details    Type of service:  Video Visit    Video End Time:11:10    Originating Location (pt. Location): Home    Distant " Location (provider location):  [facility]     Platform used for Video Visit: Aly    No follow-ups on file.       Gin Anne, DO

## 2020-05-16 ASSESSMENT — ANXIETY QUESTIONNAIRES: GAD7 TOTAL SCORE: 19

## 2020-07-09 DIAGNOSIS — Z00.00 ENCOUNTER FOR ROUTINE ADULT HEALTH EXAMINATION WITHOUT ABNORMAL FINDINGS: ICD-10-CM

## 2020-07-10 RX ORDER — DROSPIRENONE AND ETHINYL ESTRADIOL 0.02-3(28)
1 KIT ORAL DAILY
Qty: 84 TABLET | Refills: 0 | Status: CANCELLED | OUTPATIENT
Start: 2020-07-10

## 2020-07-10 RX ORDER — DROSPIRENONE AND ETHINYL ESTRADIOL 0.02-3(28)
1 KIT ORAL DAILY
Qty: 84 TABLET | Refills: 0 | Status: SHIPPED | OUTPATIENT
Start: 2020-07-10 | End: 2020-07-13

## 2020-07-10 NOTE — TELEPHONE ENCOUNTER
Routing refill request to provider for review/approval because:  Pt due for physical although had a pap 5/29/19 and due for next 5/29/22.  Did you want pt to come in for physical now or wait until later date.  Please authorize if appropriate.  Thanks,  Marissa Brown RN

## 2020-08-18 ENCOUNTER — OFFICE VISIT (OUTPATIENT)
Dept: FAMILY MEDICINE | Facility: CLINIC | Age: 25
End: 2020-08-18
Payer: COMMERCIAL

## 2020-08-18 VITALS
OXYGEN SATURATION: 98 % | BODY MASS INDEX: 31.28 KG/M2 | DIASTOLIC BLOOD PRESSURE: 75 MMHG | SYSTOLIC BLOOD PRESSURE: 117 MMHG | TEMPERATURE: 98 F | WEIGHT: 211.2 LBS | HEART RATE: 86 BPM | HEIGHT: 69 IN

## 2020-08-18 DIAGNOSIS — F41.1 GENERALIZED ANXIETY DISORDER: ICD-10-CM

## 2020-08-18 DIAGNOSIS — Z00.00 ROUTINE GENERAL MEDICAL EXAMINATION AT A HEALTH CARE FACILITY: Primary | ICD-10-CM

## 2020-08-18 DIAGNOSIS — Z00.00 ENCOUNTER FOR ROUTINE ADULT HEALTH EXAMINATION WITHOUT ABNORMAL FINDINGS: ICD-10-CM

## 2020-08-18 DIAGNOSIS — B00.1 COLD SORE: ICD-10-CM

## 2020-08-18 PROCEDURE — 80061 LIPID PANEL: CPT | Performed by: FAMILY MEDICINE

## 2020-08-18 PROCEDURE — 84443 ASSAY THYROID STIM HORMONE: CPT | Performed by: FAMILY MEDICINE

## 2020-08-18 PROCEDURE — 36415 COLL VENOUS BLD VENIPUNCTURE: CPT | Performed by: FAMILY MEDICINE

## 2020-08-18 PROCEDURE — 90715 TDAP VACCINE 7 YRS/> IM: CPT | Performed by: FAMILY MEDICINE

## 2020-08-18 PROCEDURE — 80053 COMPREHEN METABOLIC PANEL: CPT | Performed by: FAMILY MEDICINE

## 2020-08-18 PROCEDURE — 90471 IMMUNIZATION ADMIN: CPT | Performed by: FAMILY MEDICINE

## 2020-08-18 PROCEDURE — 99395 PREV VISIT EST AGE 18-39: CPT | Mod: 25 | Performed by: FAMILY MEDICINE

## 2020-08-18 RX ORDER — DROSPIRENONE AND ETHINYL ESTRADIOL 0.02-3(28)
1 KIT ORAL DAILY
Qty: 84 TABLET | Refills: 4 | Status: SHIPPED | OUTPATIENT
Start: 2020-08-18 | End: 2021-08-20

## 2020-08-18 RX ORDER — ESCITALOPRAM OXALATE 20 MG/1
20 TABLET ORAL DAILY
Qty: 90 TABLET | Refills: 1 | Status: SHIPPED | OUTPATIENT
Start: 2020-08-18 | End: 2020-12-11

## 2020-08-18 ASSESSMENT — MIFFLIN-ST. JEOR: SCORE: 1762.14

## 2020-08-18 NOTE — PROGRESS NOTES
SUBJECTIVE:   CC: Benita Robbins is an 25 year old woman who presents for preventive health visit.     Healthy Habits:     Getting at least 3 servings of Calcium per day:  Yes    Bi-annual eye exam:  Yes    Dental care twice a year:  Yes    Sleep apnea or symptoms of sleep apnea:  None    Diet:  Regular (no restrictions)    Frequency of exercise:  6-7 days/week    Duration of exercise:  45-60 minutes    Taking medications regularly:  Yes    PHQ-2 Total Score: 0    Additional concerns today:  Yes        Today's PHQ-2 Score:   PHQ-2 ( 1999 [employer]) 8/17/2020   Q1: Little interest or pleasure in doing things 0   Q2: Feeling down, depressed or hopeless 0   PHQ-2 Score 0   Q1: Little interest or pleasure in doing things Not at all   Q2: Feeling down, depressed or hopeless Not at all   PHQ-2 Score 0       Abuse: Current or Past(Physical, Sexual or Emotional)- No  Do you feel safe in your environment? Yes        Social History     Tobacco Use     Smoking status: Never Smoker     Smokeless tobacco: Never Used   Substance Use Topics     Alcohol use: Yes     Frequency: 2-3 times a week     Drinks per session: 1 or 2     Binge frequency: Never     Comment: soc- wine     If you drink alcohol do you typically have >3 drinks per day or >7 drinks per week? No     No flowsheet data found.    Reviewed orders with patient.  Reviewed health maintenance and updated orders accordingly - Yes  Patient Active Problem List   Diagnosis     Encounter for routine adult health examination without abnormal findings     Generalized anxiety disorder     Cold sore     Past Surgical History:   Procedure Laterality Date     DENTAL SURGERY      [place] teeth extraction       Social History     Tobacco Use     Smoking status: Never Smoker     Smokeless tobacco: Never Used   Substance Use Topics     Alcohol use: Yes     Frequency: 2-3 times a week     Drinks per session: 1 or 2     Binge frequency: Never     Comment: soc- wine     Family History  "  Problem Relation Age of Onset     Alzheimer Disease Paternal Grandmother      Alcoholism Paternal Grandmother      Depression Paternal Grandmother      Mental Illness Paternal Grandmother      Coronary Artery Disease Paternal Grandfather      Alcoholism Paternal Grandfather            Mammogram not appropriate for this patient based on age.    Pertinent mammograms are reviewed under the imaging tab.  History of abnormal Pap smear: NO - age 30- 65 PAP every 3 years recommended  PAP / HPV 5/29/2019   PAP NIL     Reviewed and updated as needed this visit by clinical staff  Tobacco  Allergies  Meds  Problems  Med Hx  Surg Hx  Fam Hx         Reviewed and updated as needed this visit by Provider  Tobacco  Allergies  Meds  Problems  Med Hx  Surg Hx  Fam Hx            Review of Systems  CONSTITUTIONAL: NEGATIVE for fever, chills, change in weight  INTEGUMENTARU/SKIN: NEGATIVE for worrisome rashes, moles or lesions  EYES: NEGATIVE for vision changes or irritation  ENT: NEGATIVE for ear, mouth and throat problems  RESP: NEGATIVE for significant cough or SOB  BREAST: NEGATIVE for masses, tenderness or discharge  CV: NEGATIVE for chest pain, palpitations or peripheral edema  GI: NEGATIVE for nausea, abdominal pain, heartburn, or change in bowel habits  : NEGATIVE for unusual urinary or vaginal symptoms. Periods are regular.  MUSCULOSKELETAL: NEGATIVE for significant arthralgias or myalgia  NEURO: NEGATIVE for weakness, dizziness or paresthesias  PSYCHIATRIC: NEGATIVE for changes in mood or affect     OBJECTIVE:   /75   Pulse 86   Temp 98  F (36.7  C) (Tympanic)   Ht 1.76 m (5' 9.3\")   Wt 95.8 kg (211 lb 3.2 oz)   LMP 07/28/2020 (Approximate)   SpO2 98%   BMI 30.92 kg/m    Physical Exam  GENERAL: healthy, alert and no distress  EYES: Eyes grossly normal to inspection, PERRL and conjunctivae and sclerae normal  HENT: ear canals and TM's normal, nose and mouth without ulcers or lesions  NECK: no " "adenopathy, no asymmetry, masses, or scars and thyroid normal to palpation  RESP: lungs clear to auscultation - no rales, rhonchi or wheezes  BREAST: normal without masses, tenderness or nipple discharge and no palpable axillary masses or adenopathy  CV: regular rate and rhythm, normal S1 S2, no S3 or S4, no murmur, click or rub, no peripheral edema and peripheral pulses strong  ABDOMEN: soft, nontender, no hepatosplenomegaly, no masses and bowel sounds normal  MS: no gross musculoskeletal defects noted, no edema  SKIN: no suspicious lesions or rashes  NEURO: Normal strength and tone, mentation intact and speech normal  PSYCH: mentation appears normal, affect normal/bright    Diagnostic Test Results:  Labs reviewed in Epic    ASSESSMENT/PLAN:   1. Routine general medical examination at a health care facility     - TSH with free T4 reflex  - Lipid panel reflex to direct LDL Fasting  - Comprehensive metabolic panel (BMP + Alb, Alk Phos, ALT, AST, Total. Bili, TP)    2. Encounter for routine adult health examination without abnormal findings   refilled   - drospirenone-ethinyl estradiol (MELISSA) 3-0.02 MG tablet; Take 1 tablet by mouth daily  Dispense: 84 tablet; Refill: 4    3. Generalized anxiety disorder  Improved with exercise and working with therapist  Never really took the wellbutrin -   - escitalopram (LEXAPRO) 20 MG tablet; Take 1 tablet (20 mg) by mouth daily  Dispense: 90 tablet; Refill: 1  - TSH with free T4 reflex  - Comprehensive metabolic panel (BMP + Alb, Alk Phos, ALT, AST, Total. Bili, TP)    4. Cold sore   using prn valrex - refill if needed       COUNSELING:  Reviewed preventive health counseling, as reflected in patient instructions    Estimated body mass index is 30.92 kg/m  as calculated from the following:    Height as of this encounter: 1.76 m (5' 9.3\").    Weight as of this encounter: 95.8 kg (211 lb 3.2 oz).    Weight management plan: Discussed healthy diet and exercise guidelines     reports " that she has never smoked. She has never used smokeless tobacco.      Counseling Resources:  ATP IV Guidelines  Pooled Cohorts Equation Calculator  Breast Cancer Risk Calculator  FRAX Risk Assessment  ICSI Preventive Guidelines  Dietary Guidelines for Americans, 2010  USDA's MyPlate  ASA Prophylaxis  Lung CA Screening    Gin Anne, [facility]

## 2020-08-19 LAB
ALBUMIN SERPL-MCNC: 3.8 G/DL (ref 3.4–5)
ALP SERPL-CCNC: 66 U/L (ref 40–150)
ALT SERPL W P-5'-P-CCNC: 19 U/L (ref 0–50)
ANION GAP SERPL CALCULATED.3IONS-SCNC: 8 MMOL/L (ref 3–14)
AST SERPL W P-5'-P-CCNC: 20 U/L (ref 0–45)
BILIRUB SERPL-MCNC: 0.4 MG/DL (ref 0.2–1.3)
BUN SERPL-MCNC: 10 MG/DL (ref 7–30)
CALCIUM SERPL-MCNC: 8.8 MG/DL (ref 8.5–10.1)
CHLORIDE SERPL-SCNC: 104 MMOL/L (ref 94–109)
CHOLEST SERPL-MCNC: 265 MG/DL
CO2 SERPL-SCNC: 24 MMOL/L (ref 20–32)
CREAT SERPL-MCNC: 0.68 MG/DL (ref 0.52–1.04)
GFR SERPL CREATININE-BSD FRML MDRD: >90 ML/MIN/{1.73_M2}
GLUCOSE SERPL-MCNC: 83 MG/DL (ref 70–99)
HDLC SERPL-MCNC: 67 MG/DL
LDLC SERPL CALC-MCNC: 163 MG/DL
NONHDLC SERPL-MCNC: 198 MG/DL
POTASSIUM SERPL-SCNC: 3.8 MMOL/L (ref 3.4–5.3)
PROT SERPL-MCNC: 8.4 G/DL (ref 6.8–8.8)
SODIUM SERPL-SCNC: 136 MMOL/L (ref 133–144)
TRIGL SERPL-MCNC: 173 MG/DL
TSH SERPL DL<=0.005 MIU/L-ACNC: 0.83 MU/L (ref 0.4–4)

## 2020-08-20 ENCOUNTER — MYC MEDICAL ADVICE (OUTPATIENT)
Dept: FAMILY MEDICINE | Facility: CLINIC | Age: 25
End: 2020-08-20

## 2020-08-21 NOTE — TELEPHONE ENCOUNTER
PN,  Please see below and advise.  I added the other Mychart from 8/20/20 below also.  Thanks,  Marissa Brown RN        8/20//20 MyChart:  I have a question about LIPID REFLEX TO DIRECT LDL PANEL resulted on 8/19/20, 9:35 AM.     How concerned should I be about these high numbers? My father has high cholesterol as well. Hereditary? Anything I need to start doing immediately? Thank you!!

## 2020-09-01 ENCOUNTER — E-VISIT (OUTPATIENT)
Dept: FAMILY MEDICINE | Facility: CLINIC | Age: 25
End: 2020-09-01
Payer: COMMERCIAL

## 2020-09-01 DIAGNOSIS — N39.0 ACUTE UTI (URINARY TRACT INFECTION): Primary | ICD-10-CM

## 2020-09-01 PROCEDURE — 99421 OL DIG E/M SVC 5-10 MIN: CPT | Performed by: FAMILY MEDICINE

## 2020-09-02 RX ORDER — SULFAMETHOXAZOLE/TRIMETHOPRIM 800-160 MG
1 TABLET ORAL 2 TIMES DAILY
Qty: 6 TABLET | Refills: 0 | Status: SHIPPED | OUTPATIENT
Start: 2020-09-02 | End: 2020-09-05

## 2020-09-02 NOTE — PATIENT INSTRUCTIONS
Thank you for choosing us for your care. I have placed an order for a prescription so that you can start treatment. View your full visit summary for details by clicking on the link below. Your pharmacist will able to address any questions you may have about the medication.     If you re not feeling better within 2-3 days, please schedule an appointment.  You can schedule an appointment right here in [facility], or call [phone]  If the visit is for the same symptoms as your e-visit, we ll refund the cost of your e-visit if seen within seven days.      Urinary Tract Infections in Women    Urinary tract infections (UTIs) are most often caused by bacteria. These bacteria enter the urinary tract. The bacteria may come from outside the body. Or they may travel from the skin outside the rectum or vagina into the urethra. Female anatomy makes it easy for bacteria from the bowel to enter a woman s urinary tract, which is the most common source of UTI. This means women develop UTIs more often than men. Pain in or around the urinary tract is a common UTI symptom. But the only way to know for sure if you have a UTI for the healthcare provider to test your urine. The two tests that may be done are the urinalysis and urine culture.  Types of UTIs    Cystitis. A bladder infection (cystitis) is the most common UTI in women. You may have urgent or frequent urination. You may also have pain, burning when you urinate, and bloody urine.    Urethritis. This is an inflamed urethra, which is the tube that carries urine from the bladder to outside the body. You may have lower stomach or back pain. You may also have urgent or frequent urination.    Pyelonephritis. This is a kidney infection. If not treated, it can be serious and damage your kidneys. In severe cases, you may need to stay in the hospital. You may have a fever and lower back pain.  Medicines to treat a UTI  Most UTIs are treated with antibiotics. These kill the bacteria.  The length of time you need to take them depends on the type of infection. It may be as short as 3 days. If you have repeated UTIs, you may need a low-dose antibiotic for several months. Take antibiotics exactly as directed. Don t stop taking them until all of the medicine is gone. If you stop taking the antibiotic too soon, the infection may not go away. You may also develop a resistance to the antibiotic. This can make it much harder to treat.  Lifestyle changes to treat and prevent UTIs  The lifestyle changes below will help get rid of your UTI. They may also help prevent future UTIs.    Drink plenty of fluids. This includes water, juice, or other caffeine-free drinks. Fluids help flush bacteria out of your body.    Empty your bladder. Always empty your bladder when you feel the urge to urinate. And always urinate before going to sleep. Urine that stays in your bladder can lead to infection. Try to urinate before and after sex as well.    Practice good personal hygiene. Wipe yourself from front to back after using the toilet. This helps keep bacteria from getting into the urethra.    Use condoms during sex. These help prevent UTIs caused by sexually transmitted bacteria. Also don't use spermicides during sex. These can increase the risk for UTIs. Choose other forms of birth control instead. For women who tend to get UTIs after sex, a low-dose of a preventive antibiotic may be used. Be sure to discuss this option with your healthcare provider.    Follow up with your healthcare provider as directed. He or she may test to make sure the infection has cleared. If needed, more treatment may be started.  Date Last Reviewed: 1/1/2017 2000-2019 [employer]. [address] [place] [address], [place] [address]. All rights reserved. This information is not intended as a substitute for professional medical care. Always follow your healthcare professional's instructions.

## 2020-10-06 ENCOUNTER — MYC REFILL (OUTPATIENT)
Dept: FAMILY MEDICINE | Facility: CLINIC | Age: 25
End: 2020-10-06

## 2020-10-06 DIAGNOSIS — Z00.00 ENCOUNTER FOR ROUTINE ADULT HEALTH EXAMINATION WITHOUT ABNORMAL FINDINGS: ICD-10-CM

## 2020-10-06 RX ORDER — DROSPIRENONE AND ETHINYL ESTRADIOL 0.02-3(28)
1 KIT ORAL DAILY
Qty: 84 TABLET | Refills: 4 | Status: CANCELLED | OUTPATIENT
Start: 2020-10-06

## 2020-10-13 ENCOUNTER — MYC MEDICAL ADVICE (OUTPATIENT)
Dept: FAMILY MEDICINE | Facility: CLINIC | Age: 25
End: 2020-10-13

## 2020-11-03 ENCOUNTER — VIRTUAL VISIT (OUTPATIENT)
Dept: FAMILY MEDICINE | Facility: OTHER | Age: 25
End: 2020-11-03

## 2020-11-03 NOTE — PROGRESS NOTES
"Date: 2020 09:47:52  Clinician: Ted Sanabria  Clinician NPI: [id number]  Patient: Benita Robbins  Patient : 1995  Patient Address: [address] [place] [address]  Patient Phone: [phone]  Visit Protocol: URI  Patient Summary:  Benita is a 25 year old ( : 1995 ) female who initiated a [facility] Visit for COVID-19 (Coronavirus) evaluation and screening. When asked the question \"Please sign me up to receive news, health information and promotions. \", Benita responded \"No\".    Benita states her symptoms started 1-2 days ago.   Her symptoms consist of rhinitis, facial pain or pressure, myalgia, chills, malaise, a sore throat, diarrhea, a headache, a cough, nasal congestion, and nausea. She is experiencing mild difficulty breathing with activities but can speak normally in full sentences. Benita also feels feverish.   Symptom details     Nasal secretions: The color of her mucus is yellow and white.    Cough: Benita coughs a few times an hour and her cough is not more bothersome at night. Phlegm does not come into her throat when she coughs. She believes her cough is caused by post-nasal drip.     Sore throat: Benita reports having mild throat pain (1-3 on a 10 point pain scale), does not have exudate on her tonsils, and can swallow liquids. She is not sure if the lymph nodes in her neck are enlarged. A rash has not appeared on the skin since the sore throat started.     Temperature: Her current temperature is 100.7 degrees Fahrenheit. Benita has had a temperature over 100 degrees Fahrenheit for 1-2 days.     Facial pain or pressure: The facial pain or pressure does not feel worse when bending or leaning forward.     Headache: She states the headache is moderate (4-6 on a 10 point pain scale).      Benita denies having vomiting, teeth pain, ageusia, ear pain, wheezing, and anosmia. She also denies taking antibiotic medication in the past month, having recent facial or " sinus surgery in the past 60 days, and having a sinus infection within the past year.   Precipitating events  Within the past week, Benita has not been exposed to someone with strep throat. She has not recently been exposed to someone with influenza. Benita has been in close contact with the following high risk individuals: adults 65 or older.   Pertinent COVID-19 (Coronavirus) information  Benita does not work or volunteer as healthcare worker or a . In the past 14 days, Benita has not worked or volunteered at a healthcare facility or group living setting.   In the past 14 days, she also has not lived in a congregate living setting.   Benita has had a close contact with a laboratory-confirmed COVID-19 patient within 14 days of symptom onset. She was not exposed at her work. Date Benita was exposed to the laboratory-confirmed COVID-19 patient: 10/22/2020   Additional information about contact with COVID-19 (Coronavirus) patient as reported by the patient (free text): Indoors with no mask    Since December 2019, Benita has been tested for COVID-19 and has not had upper respiratory infection or influenza-like illness.      Result of COVID-19 test: Negative     Date of her COVID-19 test: 10/29/2020      Pertinent medical history  Benita does not get yeast infections when she takes antibiotics.   Benita does not need a return to work/school note.   Weight: 195 lbs   Benita does not smoke or use smokeless tobacco.   She denies pregnancy and denies breastfeeding. She has menstruated in the past month.   Weight: 195 lbs    MEDICATIONS: MELISSA (28) oral, propranolol oral, ALLERGIES: NKDA  Clinician Response:  Dear Benita,   Your symptoms show that you may have coronavirus (COVID-19). This illness can cause fever, cough and trouble breathing. Many people get a mild case and get better on their own. Some people can get very sick.  What should I do?  We would like to test you for this virus.    "1. Please call [phone] to schedule your visit. Explain that you were referred by [facility] to have a COVID-19 test. Be ready to share your [facility] visit ID number.  The following will serve as your written order for this COVID Test, ordered by me, for the indication of suspected COVID [Z20.828]: The test will be ordered in [employer], our electronic health record, after you are scheduled. It will show as ordered and authorized by Deny Rodriguez MD.  Order: COVID-19 (Coronavirus) PCR for SYMPTOMATIC testing from [facility].   2. When it's time for your COVID test:  Stay at least 6 feet away from others. (If someone will drive you to your test, stay in the backseat, as far away from the  as you can.)   Cover your mouth and nose with a mask, tissue or washcloth.  Go straight to the testing site. Don't make any stops on the way there or back.      3.Starting now: Stay home and away from others (self-isolate) until:   You've had no fever---and no medicine that reduces fever---for one full day (24 hours). And...   Your other symptoms have gotten better. For example, your cough or breathing has improved. And...   At least 10 days have passed since your symptoms started.       During this time, don't leave the house except for testing or medical care.   Stay in your own room, even for meals. Use your own bathroom if you can.   Stay away from others in your home. No hugging, kissing or shaking hands. No visitors.  Don't go to work, school or anywhere else.    Clean \"high touch\" surfaces often (doorknobs, counters, handles, etc.). Use a household cleaning spray or wipes. You'll find a full list of  on the EPA website: www.epa.gov/pesticide-registration/list-n-disinfectants-use-against-sars-cov-2.   Cover your mouth and nose with a mask, tissue or washcloth to avoid spreading germs.  Wash your hands and face often. Use soap and water.  Caregivers in these groups are at risk for severe illness due to COVID-19:  o People 65 " years and older  o People who live in a nursing home or long-term care facility  o People with chronic disease (lung, heart, cancer, diabetes, kidney, liver, immunologic)  o People who have a weakened immune system, including those who:   Are in cancer treatment  Take medicine that weakens the immune system, such as corticosteroids  Had a bone marrow or organ transplant  Have an immune deficiency  Have poorly controlled HIV or AIDS  Are obese (body mass index of 40 or higher)  Smoke regularly   o Caregivers should wear gloves while washing dishes, handling laundry and cleaning bedrooms and bathrooms.  o Use caution when washing and drying laundry: Don't shake dirty laundry, and use the warmest water setting that you can.  o For more tips, go to www.cdc.gov/coronavirus/2019-ncov/downloads/10Things.pdf.    4.Sign up for [facility]. We know it's scary to hear that you might have COVID-19. We want to track your symptoms to make sure you're okay over the next 2 weeks. Please look for an email from [facility]---this is a free, online program that we'll use to keep in touch. To sign up, follow the link in the email. Learn more at [email or web address]  How can I take care of myself?   Get lots of rest. Drink extra fluids (unless a doctor has told you not to).   Take Tylenol (acetaminophen) for fever or pain. If you have liver or kidney problems, ask your family doctor if it's okay to take Tylenol.   Adults can take either:    650 mg (two 325 mg pills) every 4 to 6 hours, or...   1,000 mg (two 500 mg pills) every 8 hours as needed.    Note: Don't take more than 3,000 mg in one day. Acetaminophen is found in many medicines (both prescribed and over-the-counter medicines). Read all labels to be sure you don't take too much.   For children, check the Tylenol bottle for the right dose. The dose is based on the child's age or weight.    If you have other health problems (like cancer, heart failure, an organ  transplant or severe kidney disease): Call your specialty clinic if you don't feel better in the next 2 days.       Know when to call 911. Emergency warning signs include:    Trouble breathing or shortness of breath Pain or pressure in the chest that doesn't go away Feeling confused like you haven't felt before, or not being able to wake up Bluish-colored lips or face.  Where can I get more information?   [facility] -- About COVID-19: [email or web address]   CDC -- What to Do If You're Sick: www.cdc.gov/coronavirus/2019-ncov/about/steps-when-sick.html   CDC -- Ending Home Isolation: www.cdc.gov/coronavirus/2019-ncov/hcp/disposition-in-home-patients.html   [facility] -- Caring for Someone: www.cdc.gov/coronavirus/2019-ncov/if-you-are-sick/care-for-someone.html   [facility] -- Interim Guidance for Hospital Discharge to Home: www.[facility].mn./diseases/coronavirus/hcp/hospdischarge.pdf   [facility] clinical trials (COVID-19 research studies): clinicalaffairs.[facility]-clinical-trials    Below are the COVID-19 hotlines at the [facility] of Health ([facility]). Interpreters are available.    For health questions: Call 615-181-2806 or 1-622.790.1267 (7 a.m. to 7 p.m.) For questions about schools and childcare: Call 566-229-9655 or 1-220.781.1376 (7 a.m. to 7 p.m.)    Diagnosis: Contact with and (suspected) exposure to other viral communicable diseases  Diagnosis ICD: Z20.828

## 2020-11-20 ENCOUNTER — MYC MEDICAL ADVICE (OUTPATIENT)
Dept: FAMILY MEDICINE | Facility: CLINIC | Age: 25
End: 2020-11-20

## 2020-12-11 ENCOUNTER — VIRTUAL VISIT (OUTPATIENT)
Dept: FAMILY MEDICINE | Facility: CLINIC | Age: 25
End: 2020-12-11
Payer: COMMERCIAL

## 2020-12-11 DIAGNOSIS — F41.1 GENERALIZED ANXIETY DISORDER: ICD-10-CM

## 2020-12-11 PROCEDURE — 99213 OFFICE O/P EST LOW 20 MIN: CPT | Mod: 95 | Performed by: FAMILY MEDICINE

## 2020-12-11 RX ORDER — ESCITALOPRAM OXALATE 5 MG/1
TABLET ORAL
Qty: 90 TABLET | Refills: 0 | Status: SHIPPED | OUTPATIENT
Start: 2020-12-11 | End: 2021-08-20

## 2020-12-11 NOTE — PROGRESS NOTES
"Benita Robbins is a 25 year old female who is being evaluated via a billable video visit.      The patient has been notified of following:     \"This video visit will be conducted via a call between you and your physician/provider. We have found that certain health care needs can be provided without the need for an in-person physical exam.  This service lets us provide the care you need with a video conversation.  If a prescription is necessary we can send it directly to your pharmacy.  If lab work is needed we can place an order for that and you can then stop by our lab to have the test done at a later time.    Video visits are billed at different rates depending on your insurance coverage.  Please reach out to your insurance provider with any questions.    If during the course of the call the physician/provider feels a video visit is not appropriate, you will not be charged for this service.\"    Patient has given verbal consent for Video visit? Yes  How would you like to obtain your AVS? MyChart  If you are dropped from the video visit, the video invite should be resent to: MyChart  Will anyone else be joining your video visit? No     Subjective     Benita Robbins is a 25 year old female who presents today via video visit for the following health issues:    HPI        Medication Followup of escitalopram     Taking Medication as prescribed: yes    Side Effects:  Decreased libido    Medication Helping Symptoms:  Yes - pt would like to taper off, feels like she is \"ready to move on\"      Anxiety Follow-Up    How are you doing with your anxiety since your last visit? Improved     Are you having other symptoms that might be associated with anxiety? No    Have you had a significant life event? No     Are you feeling depressed? No    Do you have any concerns with your use of alcohol or other drugs? No    Social History     Tobacco Use     Smoking status: Never Smoker     Smokeless tobacco: Never Used   Substance Use " Topics     Alcohol use: Yes     Frequency: 2-3 times a week     Drinks per session: 1 or 2     Binge frequency: Never     Comment: soc- wine     Drug use: Never     MOUNA-7 SCORE 11/22/2019 1/2/2020 5/15/2020   Total Score - 7 (mild anxiety) -   Total Score 12 7 19     PHQ 5/15/2020   PHQ-9 Total Score 14   Q9: Thoughts of better off dead/self-harm past 2 weeks Not at all     Last PHQ-9 12/11/2020   1.  Little interest or pleasure in doing things 0   2.  Feeling down, depressed, or hopeless 0   3.  Trouble falling or staying asleep, or sleeping too much 0   4.  Feeling tired or having little energy 1   5.  Poor appetite or overeating 0   6.  Feeling bad about yourself 0   7.  Trouble concentrating 1   8.  Moving slowly or restless 0   Q9: Thoughts of better off dead/self-harm past 2 weeks 0   PHQ-9 Total Score 2     MOUNA-7  12/11/2020   1. Feeling nervous, anxious, or on edge 1   2. Not being able to stop or control worrying 0   3. Worrying too much about different things 1   4. Trouble relaxing 0   5. Being so restless that it is hard to sit still 0   6. Becoming easily annoyed or irritable 0   7. Feeling afraid, as if something awful might happen 1   MOUNA-7 Total Score 3   If you checked any problems, how difficult have they made it for you to do your work, take care of things at home, or get along with other people? -         How many servings of fruits and vegetables do you eat daily?  4 or more    On average, how many sweetened beverages do you drink each day (Examples: soda, juice, sweet tea, etc.  Do NOT count diet or artificially sweetened beverages)?   0    How many days per week do you exercise enough to make your heart beat faster? 7    How many minutes a day do you exercise enough to make your heart beat faster? 60 or more    How many days per week do you miss taking your medication? 0        Video Start Time: 4:36pm      Review of Systems   Constitutional, HEENT, cardiovascular, pulmonary, gi and gu systems  are negative, except as otherwise noted.      Objective           Vitals:  No vitals were obtained today due to virtual visit.    Physical Exam     GENERAL: Healthy, alert and no distress  EYES: Eyes grossly normal to inspection.  No discharge or erythema, or obvious scleral/conjunctival abnormalities.  RESP: No audible wheeze, cough, or visible cyanosis.  No visible retractions or increased work of breathing.    SKIN: Visible skin clear. No significant rash, abnormal pigmentation or lesions.  NEURO: Cranial nerves grossly intact.  Mentation and speech appropriate for age.  PSYCH: Mentation appears normal, affect normal/bright, judgement and insight intact, normal speech and appearance well-groomed.            Assessment & Plan     Generalized anxiety disorder  Doing well -   Working with a therapist  Interested in starting to wean  Will go down to 15mg daily for 6 weeks then 10mg daily for 6 weeks.  F/u 3 months - consider going to 5mg daily if she is doing well at that time.  - escitalopram (LEXAPRO) 5 MG tablet; Take 5mg daily (with the 10mg) for total daily dose of 15mg for 6 weeks then decrease to 10mg daily          No follow-ups on file.    Gin Anne DO  [facility]      Video-Visit Details    Type of service:  Video Visit    Video End Time:4:45pm    Originating Location (pt. Location): Home    Distant Location (provider location):  [facility]     Platform used for Video Visit: Lesly

## 2021-01-12 ENCOUNTER — MYC MEDICAL ADVICE (OUTPATIENT)
Dept: FAMILY MEDICINE | Facility: CLINIC | Age: 26
End: 2021-01-12

## 2021-01-12 DIAGNOSIS — Z11.59 SCREENING FOR VIRAL DISEASE: Primary | ICD-10-CM

## 2021-01-15 NOTE — TELEPHONE ENCOUNTER
PN,   See below MyChart  Pt wanted Covid antibody test, not PCR  Please advise  Thanks,  Mona CRAFT RN

## 2021-03-02 ENCOUNTER — MYC MEDICAL ADVICE (OUTPATIENT)
Dept: FAMILY MEDICINE | Facility: CLINIC | Age: 26
End: 2021-03-02

## 2021-04-01 ENCOUNTER — E-VISIT (OUTPATIENT)
Dept: URGENT CARE | Facility: CLINIC | Age: 26
End: 2021-04-01
Payer: COMMERCIAL

## 2021-04-01 DIAGNOSIS — L30.9 DERMATITIS: Primary | ICD-10-CM

## 2021-04-01 PROCEDURE — 99213 OFFICE O/P EST LOW 20 MIN: CPT | Mod: TEL | Performed by: NURSE PRACTITIONER

## 2021-04-01 RX ORDER — TRIAMCINOLONE ACETONIDE 1 MG/G
CREAM TOPICAL
COMMUNITY
Start: 2021-04-01 | End: 2021-08-20

## 2021-04-01 RX ORDER — TRIAMCINOLONE ACETONIDE 1 MG/G
OINTMENT TOPICAL 2 TIMES DAILY
Qty: 80 G | Refills: 1 | Status: SHIPPED | OUTPATIENT
Start: 2021-04-01 | End: 2021-08-20

## 2021-04-02 NOTE — TELEPHONE ENCOUNTER
Provider E-Visit time total (minutes): 5    Hi Benita,    It looks like dermatitis related to razor burn. Let's try steroid cream twice daily for 1-2 weeks. Slowly taper off. If it seems more raw and red you could also try over the counter monistat antifungal cream. Luke warm soapy compress, try not to scratch, air dry, avoid deodorant and shaving for now. You could also try more hypoallergenic detergents in the future such as Arsen's. In person evaluation if worsening.    -Cherelle Guerrero, FNP-BC

## 2021-05-11 ENCOUNTER — MYC MEDICAL ADVICE (OUTPATIENT)
Dept: FAMILY MEDICINE | Facility: CLINIC | Age: 26
End: 2021-05-11

## 2021-05-11 DIAGNOSIS — B00.1 COLD SORE: ICD-10-CM

## 2021-05-11 RX ORDER — VALACYCLOVIR HYDROCHLORIDE 1 G/1
2000 TABLET, FILM COATED ORAL 2 TIMES DAILY
Qty: 4 TABLET | Refills: 2 | Status: SHIPPED | OUTPATIENT
Start: 2021-05-11 | End: 2022-08-19

## 2021-08-19 ASSESSMENT — ENCOUNTER SYMPTOMS
NERVOUS/ANXIOUS: 0
NAUSEA: 0
FREQUENCY: 0
PARESTHESIAS: 0
HEARTBURN: 0
COUGH: 0
DYSURIA: 0
JOINT SWELLING: 0
EYE PAIN: 0
WEAKNESS: 0
CHILLS: 0
FEVER: 0
SHORTNESS OF BREATH: 0
HEMATURIA: 0
DIZZINESS: 0
PALPITATIONS: 0
HEADACHES: 0
CONSTIPATION: 0
DIARRHEA: 0
SORE THROAT: 0
ARTHRALGIAS: 0
MYALGIAS: 0
ABDOMINAL PAIN: 0
BREAST MASS: 0
HEMATOCHEZIA: 0

## 2021-08-19 NOTE — PROGRESS NOTES
SUBJECTIVE:   CC: Benita Robbins is an 26 year old woman who presents for preventive health visit.       Patient has been advised of split billing requirements and indicates understanding: Yes  Healthy Habits:     Getting at least 3 servings of Calcium per day:  Yes    Bi-annual eye exam:  Yes    Dental care twice a year:  Yes    Sleep apnea or symptoms of sleep apnea:  None    Diet:  Regular (no restrictions)    Frequency of exercise:  6-7 days/week    Duration of exercise:  45-60 minutes    Taking medications regularly:  Yes    Medication side effects:  None    PHQ-2 Total Score: 0    Additional concerns today:  No          -------------------------------------    Today's PHQ-2 Score:   PHQ-2 ( 1999 Pfizer) 8/19/2021   Q1: Little interest or pleasure in doing things 0   Q2: Feeling down, depressed or hopeless 0   PHQ-2 Score 0   Q1: Little interest or pleasure in doing things Not at all   Q2: Feeling down, depressed or hopeless Not at all   PHQ-2 Score 0       Abuse: Current or Past (Physical, Sexual or Emotional) - No  Do you feel safe in your environment? Yes    Have you ever done Advance Care Planning? (For example, a Health Directive, POLST, or a discussion with a medical provider or your loved ones about your wishes): No, advance care planning information given to patient to review.  Advanced care planning was discussed at today's visit.    Social History     Tobacco Use     Smoking status: Never Smoker     Smokeless tobacco: Never Used   Substance Use Topics     Alcohol use: Yes     Comment: soc- wine         Alcohol Use 8/19/2021   Prescreen: >3 drinks/day or >7 drinks/week? No   Prescreen: >3 drinks/day or >7 drinks/week? -       Reviewed orders with patient.  Reviewed health maintenance and updated orders accordingly - Yes  Patient Active Problem List   Diagnosis     Encounter for routine adult health examination without abnormal findings     Generalized anxiety disorder     Cold sore     Past  Surgical History:   Procedure Laterality Date     DENTAL SURGERY      [place] teeth extraction       Social History     Tobacco Use     Smoking status: Never Smoker     Smokeless tobacco: Never Used   Substance Use Topics     Alcohol use: Yes     Comment: soc- wine     Family History   Problem Relation Age of Onset     Alzheimer Disease Paternal Grandmother      Alcoholism Paternal Grandmother      Depression Paternal Grandmother      Mental Illness Paternal Grandmother      Coronary Artery Disease Paternal Grandfather      Alcoholism Paternal Grandfather            Breast Cancer Screening:    Breast CA Risk Assessment (FHS-7) 8/19/2021   Do you have a family history of breast, colon, or ovarian cancer? No / Unknown           Pertinent mammograms are reviewed under the imaging tab.    History of abnormal Pap smear: NO - age 21-29 PAP every 3 years recommended  PAP / HPV 5/29/2019   PAP (Historical) NIL     Reviewed and updated as needed this visit by clinical staff  Tobacco  Allergies  Meds   Med Hx  Surg Hx  Fam Hx  Soc Hx        Reviewed and updated as needed this visit by Provider                  Review of Systems   Constitutional: Negative for chills and fever.   HENT: Negative for congestion, ear pain, hearing loss and sore throat.    Eyes: Negative for pain and visual disturbance.   Respiratory: Negative for cough and shortness of breath.    Cardiovascular: Negative for chest pain, palpitations and peripheral edema.   Gastrointestinal: Negative for abdominal pain, constipation, diarrhea, heartburn, hematochezia and nausea.   Breasts:  Negative for tenderness, breast mass and discharge.   Genitourinary: Negative for dysuria, frequency, genital sores, hematuria, pelvic pain, urgency, vaginal bleeding and vaginal discharge.   Musculoskeletal: Negative for arthralgias, joint swelling and myalgias.   Skin: Negative for rash.   Neurological: Negative for dizziness, weakness, headaches and paresthesias.  "  Psychiatric/Behavioral: Negative for mood changes. The patient is not nervous/anxious.         OBJECTIVE:   /71   Pulse 86   Temp (!) 96  F (35.6  C) (Tympanic)   Ht 1.74 m (5' 8.5\")   Wt 98.8 kg (217 lb 12.8 oz)   LMP 08/17/2021 (Exact Date)   SpO2 98%   BMI 32.63 kg/m    Physical Exam  GENERAL: healthy, alert and no distress  EYES: Eyes grossly normal to inspection, PERRL and conjunctivae and sclerae normal  HENT: ear canals and TM's normal, nose and mouth without ulcers or lesions  NECK: no adenopathy, no asymmetry, masses, or scars and thyroid normal to palpation  RESP: lungs clear to auscultation - no rales, rhonchi or wheezes  BREAST: normal without masses, tenderness or nipple discharge and no palpable axillary masses or adenopathy  CV: regular rate and rhythm, normal S1 S2, no S3 or S4, no murmur, click or rub, no peripheral edema and peripheral pulses strong  ABDOMEN: soft, nontender, no hepatosplenomegaly, no masses and bowel sounds normal  MS: no gross musculoskeletal defects noted, no edema  SKIN: no suspicious lesions or rashes  NEURO: Normal strength and tone, mentation intact and speech normal  PSYCH: mentation appears normal, affect normal/bright    Diagnostic Test Results:  Labs reviewed in Epic    ASSESSMENT/PLAN:   1. Routine general medical examination at a health care facility     - Lipid panel reflex to direct LDL Fasting; Future  - Comprehensive metabolic panel (BMP + Alb, Alk Phos, ALT, AST, Total. Bili, TP); Future  - HIV Antigen Antibody Combo; Future  - Hepatitis C Screen Reflex to HCV RNA Quant and Genotype; Future  - Lipid panel reflex to direct LDL Fasting  - Comprehensive metabolic panel (BMP + Alb, Alk Phos, ALT, AST, Total. Bili, TP)  - HIV Antigen Antibody Combo  - Hepatitis C Screen Reflex to HCV RNA Quant and Genotype    2. Generalized anxiety disorder   weaned off her escitalopram   Is on propranolol - will increase dose slightly  - glycopyrrolate (ROBINUL) 1 MG " "tablet; Take 1 tablet (1 mg) by mouth 2 times daily as needed (sweating)  Dispense: 30 tablet; Refill: 1    3. Flushing     - propranolol (INDERAL) 40 MG tablet; Take 1.5 tablets (60 mg) by mouth every 12 hours as needed (anxiety)  Dispense: 60 tablet; Refill: 3  - glycopyrrolate (ROBINUL) 1 MG tablet; Take 1 tablet (1 mg) by mouth 2 times daily as needed (sweating)  Dispense: 30 tablet; Refill: 1    4. Generalized hyperhidrosis  Pt got Rx from dermatology for medication in the past -   Would like to try it again - pt not sure of medication  I did some research and found two recommended - she believes the glycopyrrolate was helpful  Will fill now     5. Encounter for routine adult health examination without abnormal findings     - drospirenone-ethinyl estradiol (MELISSA) 3-0.02 MG tablet; Take 1 tablet by mouth daily  Dispense: 84 tablet; Refill: 4    Patient has been advised of split billing requirements and indicates understanding: Yes  COUNSELING:  Reviewed preventive health counseling, as reflected in patient instructions    Estimated body mass index is 32.63 kg/m  as calculated from the following:    Height as of this encounter: 1.74 m (5' 8.5\").    Weight as of this encounter: 98.8 kg (217 lb 12.8 oz).    Weight management plan: Discussed healthy diet and exercise guidelines    She reports that she has never smoked. She has never used smokeless tobacco.      Counseling Resources:  ATP IV Guidelines  Pooled Cohorts Equation Calculator  Breast Cancer Risk Calculator  BRCA-Related Cancer Risk Assessment: FHS-7 Tool  FRAX Risk Assessment  ICSI Preventive Guidelines  Dietary Guidelines for Americans, 2010  USDA's MyPlate  ASA Prophylaxis  Lung CA Screening    Gin Anne, [facility]  "

## 2021-08-20 ENCOUNTER — OFFICE VISIT (OUTPATIENT)
Dept: FAMILY MEDICINE | Facility: CLINIC | Age: 26
End: 2021-08-20
Payer: COMMERCIAL

## 2021-08-20 VITALS
TEMPERATURE: 96 F | SYSTOLIC BLOOD PRESSURE: 122 MMHG | HEIGHT: 69 IN | BODY MASS INDEX: 32.26 KG/M2 | OXYGEN SATURATION: 98 % | DIASTOLIC BLOOD PRESSURE: 71 MMHG | WEIGHT: 217.8 LBS | HEART RATE: 86 BPM

## 2021-08-20 DIAGNOSIS — Z00.00 ROUTINE GENERAL MEDICAL EXAMINATION AT A HEALTH CARE FACILITY: Primary | ICD-10-CM

## 2021-08-20 DIAGNOSIS — F41.1 GENERALIZED ANXIETY DISORDER: ICD-10-CM

## 2021-08-20 DIAGNOSIS — R61 GENERALIZED HYPERHIDROSIS: ICD-10-CM

## 2021-08-20 DIAGNOSIS — R23.2 FLUSHING: ICD-10-CM

## 2021-08-20 DIAGNOSIS — Z00.00 ENCOUNTER FOR ROUTINE ADULT HEALTH EXAMINATION WITHOUT ABNORMAL FINDINGS: ICD-10-CM

## 2021-08-20 LAB
ALBUMIN SERPL-MCNC: 3.8 G/DL (ref 3.4–5)
ALP SERPL-CCNC: 65 U/L (ref 40–150)
ALT SERPL W P-5'-P-CCNC: 29 U/L (ref 0–50)
ANION GAP SERPL CALCULATED.3IONS-SCNC: 8 MMOL/L (ref 3–14)
AST SERPL W P-5'-P-CCNC: 22 U/L (ref 0–45)
BILIRUB SERPL-MCNC: 0.4 MG/DL (ref 0.2–1.3)
BUN SERPL-MCNC: 13 MG/DL (ref 7–30)
CALCIUM SERPL-MCNC: 10.1 MG/DL (ref 8.5–10.1)
CHLORIDE BLD-SCNC: 107 MMOL/L (ref 94–109)
CHOLEST SERPL-MCNC: 238 MG/DL
CO2 SERPL-SCNC: 22 MMOL/L (ref 20–32)
CREAT SERPL-MCNC: 0.8 MG/DL (ref 0.52–1.04)
FASTING STATUS PATIENT QL REPORTED: YES
GFR SERPL CREATININE-BSD FRML MDRD: >90 ML/MIN/1.73M2
GLUCOSE BLD-MCNC: 93 MG/DL (ref 70–99)
HCV AB SERPL QL IA: NONREACTIVE
HDLC SERPL-MCNC: 62 MG/DL
HIV 1+2 AB+HIV1 P24 AG SERPL QL IA: NONREACTIVE
LDLC SERPL CALC-MCNC: 139 MG/DL
NONHDLC SERPL-MCNC: 176 MG/DL
POTASSIUM BLD-SCNC: 4.1 MMOL/L (ref 3.4–5.3)
PROT SERPL-MCNC: 7.9 G/DL (ref 6.8–8.8)
SODIUM SERPL-SCNC: 137 MMOL/L (ref 133–144)
TRIGL SERPL-MCNC: 185 MG/DL

## 2021-08-20 PROCEDURE — 99214 OFFICE O/P EST MOD 30 MIN: CPT | Mod: 25 | Performed by: FAMILY MEDICINE

## 2021-08-20 PROCEDURE — 36415 COLL VENOUS BLD VENIPUNCTURE: CPT | Performed by: FAMILY MEDICINE

## 2021-08-20 PROCEDURE — 99395 PREV VISIT EST AGE 18-39: CPT | Performed by: FAMILY MEDICINE

## 2021-08-20 PROCEDURE — 80053 COMPREHEN METABOLIC PANEL: CPT | Performed by: FAMILY MEDICINE

## 2021-08-20 PROCEDURE — 80061 LIPID PANEL: CPT | Performed by: FAMILY MEDICINE

## 2021-08-20 PROCEDURE — 87389 HIV-1 AG W/HIV-1&-2 AB AG IA: CPT | Performed by: FAMILY MEDICINE

## 2021-08-20 PROCEDURE — 86803 HEPATITIS C AB TEST: CPT | Performed by: FAMILY MEDICINE

## 2021-08-20 RX ORDER — DROSPIRENONE AND ETHINYL ESTRADIOL 0.02-3(28)
1 KIT ORAL DAILY
Qty: 84 TABLET | Refills: 4 | Status: SHIPPED | OUTPATIENT
Start: 2021-08-20 | End: 2022-08-19

## 2021-08-20 RX ORDER — PROPRANOLOL HYDROCHLORIDE 40 MG/1
60 TABLET ORAL EVERY 12 HOURS PRN
Qty: 60 TABLET | Refills: 3 | Status: SHIPPED | OUTPATIENT
Start: 2021-08-20 | End: 2022-08-19

## 2021-08-20 RX ORDER — GLYCOPYRROLATE 1 MG/1
1 TABLET ORAL 2 TIMES DAILY PRN
Qty: 30 TABLET | Refills: 1 | Status: SHIPPED | OUTPATIENT
Start: 2021-08-20 | End: 2022-06-21

## 2021-08-20 ASSESSMENT — ENCOUNTER SYMPTOMS
CONSTIPATION: 0
CHILLS: 0
ARTHRALGIAS: 0
NERVOUS/ANXIOUS: 0
COUGH: 0
PALPITATIONS: 0
FEVER: 0
SORE THROAT: 0
JOINT SWELLING: 0
NAUSEA: 0
BREAST MASS: 0
HEARTBURN: 0
HEMATOCHEZIA: 0
WEAKNESS: 0
FREQUENCY: 0
DYSURIA: 0
DIZZINESS: 0
EYE PAIN: 0
HEADACHES: 0
HEMATURIA: 0
SHORTNESS OF BREATH: 0
DIARRHEA: 0
MYALGIAS: 0
ABDOMINAL PAIN: 0
PARESTHESIAS: 0

## 2021-08-20 ASSESSMENT — MIFFLIN-ST. JEOR: SCORE: 1774.37

## 2021-08-25 ENCOUNTER — MYC MEDICAL ADVICE (OUTPATIENT)
Dept: FAMILY MEDICINE | Facility: CLINIC | Age: 26
End: 2021-08-25

## 2021-08-25 NOTE — RESULT ENCOUNTER NOTE
Dear Benita,   Your test results are all back -   -LDL(bad) cholesterol level is elevated, and your triglycerides are elevated which can increase your heart disease risk.  A diet high in fat and simple carbohydrates, genetics and being overweight can contribute to this. ADVISE: exercising 150 minutes of aerobic exercise per week (30 minutes for 5 days per week or 50 minutes for 3 days per week are options), eating a low saturated fat/low carbohydrate diet, and omega-3 fatty acids (fish oil) 9932-3924 mg daily are helpful to improve this. In 12 months, you should recheck your fasting cholesterol panel by scheduling a lab-only appointment.  -Liver and gallbladder tests are normal (ALT,AST, Alk phos, bilirubin), kidney function is normal (Cr, GFR), sodium is normal, potassium is normal, calcium is normal, glucose is normal.  -Hepatitis C antibody screen test shows no signs of a previous hepatitis C infection.  -HIV test is normal.  Let us know if you have any questions.  -Gin Anne, DO

## 2021-10-02 ENCOUNTER — HEALTH MAINTENANCE LETTER (OUTPATIENT)
Age: 26
End: 2021-10-02

## 2021-11-12 ENCOUNTER — LAB (OUTPATIENT)
Dept: LAB | Facility: CLINIC | Age: 26
End: 2021-11-12
Payer: COMMERCIAL

## 2021-11-12 DIAGNOSIS — L70.0 ACNE VULGARIS: ICD-10-CM

## 2021-11-12 DIAGNOSIS — L70.8 OTHER ACNE: Primary | ICD-10-CM

## 2021-11-12 PROCEDURE — 84132 ASSAY OF SERUM POTASSIUM: CPT

## 2021-11-12 PROCEDURE — 36415 COLL VENOUS BLD VENIPUNCTURE: CPT

## 2021-11-13 LAB — POTASSIUM BLD-SCNC: 3.8 MMOL/L (ref 3.4–5.3)

## 2021-12-19 ENCOUNTER — E-VISIT (OUTPATIENT)
Dept: FAMILY MEDICINE | Facility: CLINIC | Age: 26
End: 2021-12-19
Payer: COMMERCIAL

## 2021-12-19 ENCOUNTER — MYC MEDICAL ADVICE (OUTPATIENT)
Dept: FAMILY MEDICINE | Facility: CLINIC | Age: 26
End: 2021-12-19
Payer: COMMERCIAL

## 2021-12-19 DIAGNOSIS — F41.0 PANIC ATTACK: Primary | ICD-10-CM

## 2021-12-19 PROCEDURE — 99421 OL DIG E/M SVC 5-10 MIN: CPT | Performed by: FAMILY MEDICINE

## 2021-12-19 ASSESSMENT — ANXIETY QUESTIONNAIRES
1. FEELING NERVOUS, ANXIOUS, OR ON EDGE: NEARLY EVERY DAY
4. TROUBLE RELAXING: NEARLY EVERY DAY
5. BEING SO RESTLESS THAT IT IS HARD TO SIT STILL: NEARLY EVERY DAY
3. WORRYING TOO MUCH ABOUT DIFFERENT THINGS: NEARLY EVERY DAY
7. FEELING AFRAID AS IF SOMETHING AWFUL MIGHT HAPPEN: NOT AT ALL
GAD7 TOTAL SCORE: 15
7. FEELING AFRAID AS IF SOMETHING AWFUL MIGHT HAPPEN: NOT AT ALL
GAD7 TOTAL SCORE: 15
2. NOT BEING ABLE TO STOP OR CONTROL WORRYING: NEARLY EVERY DAY
8. IF YOU CHECKED OFF ANY PROBLEMS, HOW DIFFICULT HAVE THESE MADE IT FOR YOU TO DO YOUR WORK, TAKE CARE OF THINGS AT HOME, OR GET ALONG WITH OTHER PEOPLE?: EXTREMELY DIFFICULT
GAD7 TOTAL SCORE: 15
6. BECOMING EASILY ANNOYED OR IRRITABLE: NOT AT ALL

## 2021-12-19 ASSESSMENT — PATIENT HEALTH QUESTIONNAIRE - PHQ9
10. IF YOU CHECKED OFF ANY PROBLEMS, HOW DIFFICULT HAVE THESE PROBLEMS MADE IT FOR YOU TO DO YOUR WORK, TAKE CARE OF THINGS AT HOME, OR GET ALONG WITH OTHER PEOPLE: EXTREMELY DIFFICULT
SUM OF ALL RESPONSES TO PHQ QUESTIONS 1-9: 12
SUM OF ALL RESPONSES TO PHQ QUESTIONS 1-9: 12

## 2021-12-20 ASSESSMENT — ANXIETY QUESTIONNAIRES: GAD7 TOTAL SCORE: 15

## 2021-12-20 ASSESSMENT — PATIENT HEALTH QUESTIONNAIRE - PHQ9: SUM OF ALL RESPONSES TO PHQ QUESTIONS 1-9: 12

## 2021-12-21 RX ORDER — LORAZEPAM 0.5 MG/1
0.5 TABLET ORAL EVERY 8 HOURS PRN
Qty: 15 TABLET | Refills: 0 | Status: SHIPPED | OUTPATIENT
Start: 2021-12-21 | End: 2022-08-02

## 2021-12-21 RX ORDER — ONDANSETRON 4 MG/1
4 TABLET, ORALLY DISINTEGRATING ORAL EVERY 8 HOURS PRN
Qty: 15 TABLET | Refills: 0 | Status: SHIPPED | OUTPATIENT
Start: 2021-12-21 | End: 2022-08-22

## 2022-01-28 ENCOUNTER — TRANSFERRED RECORDS (OUTPATIENT)
Dept: HEALTH INFORMATION MANAGEMENT | Facility: CLINIC | Age: 27
End: 2022-01-28
Payer: COMMERCIAL

## 2022-05-05 NOTE — PROGRESS NOTES
"Benita is a 27 year old who is being evaluated via a billable video visit.      How would you like to obtain your AVS? MyChart  If the video visit is dropped, the invitation should be resent by: Text to cell phone: [phone]  Will anyone else be joining your video visit? No     Video Start Time: 9:03 AM    Assessment & Plan         Pharyngitis, unspecified etiology     - Symptomatic; Yes; 5/2/2022 COVID-19 Virus (Coronavirus) by PCR Nose; Future  - Streptococcus A Rapid Scr w Reflx to PCR - Lab Collect; Future    Fever, unspecified fever cause   advised if COVID is positive to stop the tamiflu (she go from virtual provider)  Will also check strep and if positive stop the tamiflu   Advised signs and symptoms to monitor for  - Symptomatic; Yes; 5/2/2022 COVID-19 Virus (Coronavirus) by PCR Nose; Future             BMI:   Estimated body mass index is 32.63 kg/m  as calculated from the following:    Height as of 8/20/21: 1.74 m (5' 8.5\").    Weight as of 8/20/21: 98.8 kg (217 lb 12.8 oz).           No follow-ups on file.    Gin Anne [facility]    Subjective   Benita is a 27 year old who presents for the following health issues     History of Present Illness       Reason for visit:  Covid symptoms - fever, headache, sore throat, cough, exhaustion, aches, chills  Symptom onset:  3-7 days ago  Symptoms include:  Covid symptoms - fever, headache, sore throat, cough, exhaustion, aches, chills  Symptom intensity:  Severe  Symptom progression:  Worsening  Had these symptoms before:  No  What makes it worse:  Getting up and doing anything    She eats 4 or more servings of fruits and vegetables daily.She consumes 0 sweetened beverage(s) daily.She exercises with enough effort to increase her heart rate 30 to 60 minutes per day.  She exercises with enough effort to increase her heart rate 6 days per week.   She is taking medications regularly.     Patient would also like to discuss about " medication (TamiFlu) she is currently taking for symptoms. Patient is currently taking 2 doses. She also adds that she has taken 1 rapid test, 1 PCR (did spit test at [facility])  and 4 at home test. All came back negative.     On Saturday was at wedding -   Exposed to family and bride who have COVID  Monday symptoms started  Has taken 6 COVID tests and all are negative  Cough and headache   Was prescribed Tamiflu             Review of Systems   Constitutional, HEENT, cardiovascular, pulmonary, gi and gu systems are negative, except as otherwise noted.      Objective           Vitals:  No vitals were obtained today due to virtual visit.    Physical Exam   GENERAL:alert and no distress  EYES: Eyes grossly normal to inspection.  No discharge or erythema, or obvious scleral/conjunctival abnormalities.  RESP: No audible wheeze, some cough  - no visible cyanosis.  No visible retractions or increased work of breathing.    SKIN: Visible skin clear. No significant rash, abnormal pigmentation or lesions.  NEURO: Cranial nerves grossly intact.  Mentation and speech appropriate for age.  PSYCH: Mentation appears normal, affect normal/bright, judgement and insight intact, normal speech and appearance well-groomed.                Video-Visit Details    Type of service:  Video Visit    Video End Time:9:15 AM    Originating Location (pt. Location): Home    Distant Location (provider location):  [facility]     Platform used for Video Visit: [employer]

## 2022-05-06 ENCOUNTER — LAB (OUTPATIENT)
Dept: URGENT CARE | Facility: URGENT CARE | Age: 27
End: 2022-05-06
Attending: FAMILY MEDICINE
Payer: COMMERCIAL

## 2022-05-06 ENCOUNTER — VIRTUAL VISIT (OUTPATIENT)
Dept: FAMILY MEDICINE | Facility: CLINIC | Age: 27
End: 2022-05-06

## 2022-05-06 DIAGNOSIS — R50.9 FEVER, UNSPECIFIED FEVER CAUSE: ICD-10-CM

## 2022-05-06 DIAGNOSIS — Z20.822 SUSPECTED 2019 NOVEL CORONAVIRUS INFECTION: ICD-10-CM

## 2022-05-06 DIAGNOSIS — J02.9 PHARYNGITIS, UNSPECIFIED ETIOLOGY: Primary | ICD-10-CM

## 2022-05-06 DIAGNOSIS — J02.9 PHARYNGITIS, UNSPECIFIED ETIOLOGY: ICD-10-CM

## 2022-05-06 LAB
DEPRECATED S PYO AG THROAT QL EIA: NEGATIVE
GROUP A STREP BY PCR: NOT DETECTED
SARS-COV-2 RNA RESP QL NAA+PROBE: NEGATIVE

## 2022-05-06 PROCEDURE — 99213 OFFICE O/P EST LOW 20 MIN: CPT | Mod: GT | Performed by: FAMILY MEDICINE

## 2022-05-06 PROCEDURE — U0005 INFEC AGEN DETEC AMPLI PROBE: HCPCS

## 2022-05-06 PROCEDURE — U0003 INFECTIOUS AGENT DETECTION BY NUCLEIC ACID (DNA OR RNA); SEVERE ACUTE RESPIRATORY SYNDROME CORONAVIRUS 2 (SARS-COV-2) (CORONAVIRUS DISEASE [COVID-19]), AMPLIFIED PROBE TECHNIQUE, MAKING USE OF HIGH THROUGHPUT TECHNOLOGIES AS DESCRIBED BY CMS-2020-01-R: HCPCS

## 2022-05-06 PROCEDURE — 87651 STREP A DNA AMP PROBE: CPT

## 2022-05-08 ENCOUNTER — MYC MEDICAL ADVICE (OUTPATIENT)
Dept: FAMILY MEDICINE | Facility: CLINIC | Age: 27
End: 2022-05-08
Payer: COMMERCIAL

## 2022-05-08 DIAGNOSIS — J20.8 ACUTE BRONCHITIS DUE TO COVID-19 VIRUS: Primary | ICD-10-CM

## 2022-05-08 DIAGNOSIS — U07.1 ACUTE BRONCHITIS DUE TO COVID-19 VIRUS: Primary | ICD-10-CM

## 2022-05-09 NOTE — RESULT ENCOUNTER NOTE
Dear Benita,   Your test results are all back -   -All of your labs are normal.  Let us know if you continue to have symptoms.  Let us know if you have any questions.  -Gin Anne, DO

## 2022-05-13 RX ORDER — BENZONATATE 100 MG/1
100 CAPSULE ORAL 3 TIMES DAILY PRN
Qty: 21 CAPSULE | Refills: 0 | Status: SHIPPED | OUTPATIENT
Start: 2022-05-13 | End: 2022-08-02

## 2022-06-20 ENCOUNTER — MYC MEDICAL ADVICE (OUTPATIENT)
Dept: FAMILY MEDICINE | Facility: CLINIC | Age: 27
End: 2022-06-20
Payer: COMMERCIAL

## 2022-06-20 DIAGNOSIS — R23.2 FLUSHING: ICD-10-CM

## 2022-06-20 DIAGNOSIS — F41.1 GENERALIZED ANXIETY DISORDER: ICD-10-CM

## 2022-06-21 RX ORDER — GLYCOPYRROLATE 1 MG/1
1 TABLET ORAL 2 TIMES DAILY PRN
Qty: 30 TABLET | Refills: 0 | Status: SHIPPED | OUTPATIENT
Start: 2022-06-21 | End: 2022-08-02

## 2022-06-21 NOTE — TELEPHONE ENCOUNTER
PN,    Routing refill request to provider for review/approval because:  Drug not on the [facility] refill protocol   Last OV 8/20/21 - physical     Thank you,  Allegra Quintero RN

## 2022-08-01 ENCOUNTER — E-VISIT (OUTPATIENT)
Dept: FAMILY MEDICINE | Facility: CLINIC | Age: 27
End: 2022-08-01
Payer: COMMERCIAL

## 2022-08-01 DIAGNOSIS — F51.04 PSYCHOPHYSIOLOGICAL INSOMNIA: Primary | ICD-10-CM

## 2022-08-01 DIAGNOSIS — F41.1 GENERALIZED ANXIETY DISORDER: ICD-10-CM

## 2022-08-01 DIAGNOSIS — F41.0 PANIC ATTACK: ICD-10-CM

## 2022-08-01 PROCEDURE — 99421 OL DIG E/M SVC 5-10 MIN: CPT | Performed by: FAMILY MEDICINE

## 2022-08-01 ASSESSMENT — ANXIETY QUESTIONNAIRES
8. IF YOU CHECKED OFF ANY PROBLEMS, HOW DIFFICULT HAVE THESE MADE IT FOR YOU TO DO YOUR WORK, TAKE CARE OF THINGS AT HOME, OR GET ALONG WITH OTHER PEOPLE?: EXTREMELY DIFFICULT
GAD7 TOTAL SCORE: 19
GAD7 TOTAL SCORE: 19
7. FEELING AFRAID AS IF SOMETHING AWFUL MIGHT HAPPEN: MORE THAN HALF THE DAYS
5. BEING SO RESTLESS THAT IT IS HARD TO SIT STILL: NEARLY EVERY DAY
6. BECOMING EASILY ANNOYED OR IRRITABLE: MORE THAN HALF THE DAYS
7. FEELING AFRAID AS IF SOMETHING AWFUL MIGHT HAPPEN: MORE THAN HALF THE DAYS
3. WORRYING TOO MUCH ABOUT DIFFERENT THINGS: NEARLY EVERY DAY
4. TROUBLE RELAXING: NEARLY EVERY DAY
2. NOT BEING ABLE TO STOP OR CONTROL WORRYING: NEARLY EVERY DAY
1. FEELING NERVOUS, ANXIOUS, OR ON EDGE: NEARLY EVERY DAY
GAD7 TOTAL SCORE: 19

## 2022-08-02 RX ORDER — TRAZODONE HYDROCHLORIDE 50 MG/1
25-50 TABLET, FILM COATED ORAL AT BEDTIME
Qty: 30 TABLET | Refills: 1 | Status: SHIPPED | OUTPATIENT
Start: 2022-08-02 | End: 2023-07-20

## 2022-08-02 RX ORDER — LORAZEPAM 0.5 MG/1
0.5 TABLET ORAL EVERY 8 HOURS PRN
Qty: 15 TABLET | Refills: 0 | Status: SHIPPED | OUTPATIENT
Start: 2022-08-02 | End: 2023-01-24

## 2022-08-02 ASSESSMENT — ANXIETY QUESTIONNAIRES: GAD7 TOTAL SCORE: 19

## 2022-08-14 ASSESSMENT — ENCOUNTER SYMPTOMS
WEAKNESS: 0
BREAST MASS: 0
MYALGIAS: 0
HEARTBURN: 0
DIZZINESS: 0
FREQUENCY: 0
FEVER: 0
CONSTIPATION: 0
SORE THROAT: 0
NAUSEA: 0
ARTHRALGIAS: 0
PALPITATIONS: 0
PARESTHESIAS: 0
DYSURIA: 0
DIARRHEA: 0
NERVOUS/ANXIOUS: 0
SHORTNESS OF BREATH: 0
JOINT SWELLING: 0
HEMATOCHEZIA: 0
EYE PAIN: 0
CHILLS: 0
ABDOMINAL PAIN: 0
HEADACHES: 0
HEMATURIA: 0
COUGH: 0

## 2022-08-17 ENCOUNTER — MYC MEDICAL ADVICE (OUTPATIENT)
Dept: FAMILY MEDICINE | Facility: CLINIC | Age: 27
End: 2022-08-17

## 2022-08-18 NOTE — TELEPHONE ENCOUNTER
MP (DOD),  Please see below [facility] Consumer Services[facility] message and advise.  Ok to get pap if light period?  Thanks,  Mona CRAFT RN

## 2022-08-19 ENCOUNTER — OFFICE VISIT (OUTPATIENT)
Dept: FAMILY MEDICINE | Facility: CLINIC | Age: 27
End: 2022-08-19
Payer: COMMERCIAL

## 2022-08-19 VITALS
BODY MASS INDEX: 31.89 KG/M2 | DIASTOLIC BLOOD PRESSURE: 82 MMHG | OXYGEN SATURATION: 98 % | WEIGHT: 215.3 LBS | TEMPERATURE: 98.1 F | HEIGHT: 69 IN | HEART RATE: 84 BPM | SYSTOLIC BLOOD PRESSURE: 127 MMHG

## 2022-08-19 DIAGNOSIS — Z12.4 SCREENING FOR MALIGNANT NEOPLASM OF CERVIX: ICD-10-CM

## 2022-08-19 DIAGNOSIS — Z00.00 ENCOUNTER FOR ROUTINE ADULT HEALTH EXAMINATION WITHOUT ABNORMAL FINDINGS: Primary | ICD-10-CM

## 2022-08-19 DIAGNOSIS — R23.2 FLUSHING: ICD-10-CM

## 2022-08-19 DIAGNOSIS — F41.1 GENERALIZED ANXIETY DISORDER: ICD-10-CM

## 2022-08-19 LAB
ALBUMIN SERPL-MCNC: 3.8 G/DL (ref 3.4–5)
ALP SERPL-CCNC: 71 U/L (ref 40–150)
ALT SERPL W P-5'-P-CCNC: 22 U/L (ref 0–50)
ANION GAP SERPL CALCULATED.3IONS-SCNC: 6 MMOL/L (ref 3–14)
AST SERPL W P-5'-P-CCNC: 19 U/L (ref 0–45)
BILIRUB SERPL-MCNC: 0.3 MG/DL (ref 0.2–1.3)
BUN SERPL-MCNC: 12 MG/DL (ref 7–30)
CALCIUM SERPL-MCNC: 9.5 MG/DL (ref 8.5–10.1)
CHLORIDE BLD-SCNC: 107 MMOL/L (ref 94–109)
CHOLEST SERPL-MCNC: 220 MG/DL
CO2 SERPL-SCNC: 23 MMOL/L (ref 20–32)
CREAT SERPL-MCNC: 0.72 MG/DL (ref 0.52–1.04)
FASTING STATUS PATIENT QL REPORTED: YES
GFR SERPL CREATININE-BSD FRML MDRD: >90 ML/MIN/1.73M2
GLUCOSE BLD-MCNC: 99 MG/DL (ref 70–99)
HDLC SERPL-MCNC: 66 MG/DL
LDLC SERPL CALC-MCNC: 125 MG/DL
NONHDLC SERPL-MCNC: 154 MG/DL
POTASSIUM BLD-SCNC: 4 MMOL/L (ref 3.4–5.3)
PROT SERPL-MCNC: 7.9 G/DL (ref 6.8–8.8)
SODIUM SERPL-SCNC: 136 MMOL/L (ref 133–144)
TRIGL SERPL-MCNC: 146 MG/DL

## 2022-08-19 PROCEDURE — G0145 SCR C/V CYTO,THINLAYER,RESCR: HCPCS | Performed by: FAMILY MEDICINE

## 2022-08-19 PROCEDURE — 80061 LIPID PANEL: CPT | Performed by: FAMILY MEDICINE

## 2022-08-19 PROCEDURE — 87624 HPV HI-RISK TYP POOLED RSLT: CPT | Performed by: FAMILY MEDICINE

## 2022-08-19 PROCEDURE — 80053 COMPREHEN METABOLIC PANEL: CPT | Performed by: FAMILY MEDICINE

## 2022-08-19 PROCEDURE — 99395 PREV VISIT EST AGE 18-39: CPT | Performed by: FAMILY MEDICINE

## 2022-08-19 PROCEDURE — 36415 COLL VENOUS BLD VENIPUNCTURE: CPT | Performed by: FAMILY MEDICINE

## 2022-08-19 RX ORDER — SPIRONOLACTONE 100 MG/1
100 TABLET, FILM COATED ORAL DAILY
COMMUNITY
Start: 2022-08-19

## 2022-08-19 RX ORDER — DROSPIRENONE AND ETHINYL ESTRADIOL 0.02-3(28)
1 KIT ORAL DAILY
Qty: 84 TABLET | Refills: 4 | Status: SHIPPED | OUTPATIENT
Start: 2022-08-19 | End: 2023-09-14

## 2022-08-19 RX ORDER — PROPRANOLOL HYDROCHLORIDE 40 MG/1
40 TABLET ORAL EVERY 12 HOURS PRN
Qty: 60 TABLET | Refills: 3 | Status: SHIPPED | OUTPATIENT
Start: 2022-08-19 | End: 2023-10-17

## 2022-08-19 ASSESSMENT — ENCOUNTER SYMPTOMS
NAUSEA: 0
HEMATOCHEZIA: 0
SORE THROAT: 0
ABDOMINAL PAIN: 0
CONSTIPATION: 0
COUGH: 0
DIZZINESS: 0
ARTHRALGIAS: 0
PARESTHESIAS: 0
WEAKNESS: 0
HEADACHES: 0
NERVOUS/ANXIOUS: 0
FEVER: 0
HEARTBURN: 0
CHILLS: 0
FREQUENCY: 0
BREAST MASS: 0
MYALGIAS: 0
HEMATURIA: 0
SHORTNESS OF BREATH: 0
DYSURIA: 0
JOINT SWELLING: 0
DIARRHEA: 0
PALPITATIONS: 0
EYE PAIN: 0

## 2022-08-19 ASSESSMENT — PATIENT HEALTH QUESTIONNAIRE - PHQ9
10. IF YOU CHECKED OFF ANY PROBLEMS, HOW DIFFICULT HAVE THESE PROBLEMS MADE IT FOR YOU TO DO YOUR WORK, TAKE CARE OF THINGS AT HOME, OR GET ALONG WITH OTHER PEOPLE: SOMEWHAT DIFFICULT
SUM OF ALL RESPONSES TO PHQ QUESTIONS 1-9: 2
SUM OF ALL RESPONSES TO PHQ QUESTIONS 1-9: 2

## 2022-08-19 ASSESSMENT — PAIN SCALES - GENERAL: PAINLEVEL: NO PAIN (0)

## 2022-08-19 NOTE — PROGRESS NOTES
SUBJECTIVE:   CC: Benita Robbins is an 27 year old woman who presents for preventive health visit.         Healthy Habits:     Getting at least 3 servings of Calcium per day:  Yes    Bi-annual eye exam:  Yes    Dental care twice a year:  Yes    Sleep apnea or symptoms of sleep apnea:  None    Diet:  Regular (no restrictions)    Frequency of exercise:  6-7 days/week    Duration of exercise:  Greater than 60 minutes    Taking medications regularly:  Yes    PHQ-2 Total Score: 1    Additional concerns today:  Yes          PROBLEMS TO ADD ON...    Today's PHQ-2 Score:   PHQ-2 ( 1999 Pfizer) 8/14/2022   Q1: Little interest or pleasure in doing things 0   Q2: Feeling down, depressed or hopeless 1   PHQ-2 Score 1   PHQ-2 Total Score (12-17 Years)- Positive if 3 or more points; Administer PHQ-A if positive -   Q1: Little interest or pleasure in doing things Not at all   Q2: Feeling down, depressed or hopeless Several days   PHQ-2 Score 1       Abuse: Current or Past (Physical, Sexual or Emotional) - No  Do you feel safe in your environment? Yes        Social History     Tobacco Use     Smoking status: Never Smoker     Smokeless tobacco: Never Used   Substance Use Topics     Alcohol use: Yes     Comment: soc- wine     If you drink alcohol do you typically have >3 drinks per day or >7 drinks per week? Yes      Alcohol Use 8/14/2022   Prescreen: >3 drinks/day or >7 drinks/week? No   Prescreen: >3 drinks/day or >7 drinks/week? -       Reviewed orders with patient.  Reviewed health maintenance and updated orders accordingly - Yes  Patient Active Problem List   Diagnosis     Encounter for routine adult health examination without abnormal findings     Generalized anxiety disorder     Cold sore     Past Surgical History:   Procedure Laterality Date     DENTAL SURGERY      [place] teeth extraction       Social History     Tobacco Use     Smoking status: Never Smoker     Smokeless tobacco: Never Used   Substance Use Topics      Alcohol use: Yes     Comment: soc- wine     Family History   Problem Relation Age of Onset     Alzheimer Disease Paternal Grandmother      Alcoholism Paternal Grandmother      Depression Paternal Grandmother      Mental Illness Paternal Grandmother      Substance Abuse Paternal Grandmother      Coronary Artery Disease Paternal Grandfather      Alcoholism Paternal Grandfather      Substance Abuse Paternal Grandfather      Hyperlipidemia Mother      Hyperlipidemia Maternal Grandmother            Breast Cancer Screening:    Breast CA Risk Assessment (FHS-7) 8/19/2021   Do you have a family history of breast, colon, or ovarian cancer? No / Unknown         Patient under 40 years of age: Routine Mammogram Screening not recommended.   Pertinent mammograms are reviewed under the imaging tab.    History of abnormal Pap smear: NO - age 21-29 PAP every 3 years recommended  PAP / HPV 5/29/2019   PAP (Historical) NIL     Reviewed and updated as needed this visit by clinical staff   Tobacco  Allergies  Meds                Reviewed and updated as needed this visit by Provider                       Review of Systems   Constitutional: Negative for chills and fever.   HENT: Negative for congestion, ear pain, hearing loss and sore throat.    Eyes: Negative for pain and visual disturbance.   Respiratory: Negative for cough and shortness of breath.    Cardiovascular: Positive for chest pain. Negative for palpitations and peripheral edema.   Gastrointestinal: Negative for abdominal pain, constipation, diarrhea, heartburn, hematochezia and nausea.   Breasts:  Negative for tenderness, breast mass and discharge.   Genitourinary: Positive for pelvic pain. Negative for dysuria, frequency, genital sores, hematuria, urgency, vaginal bleeding and vaginal discharge.   Musculoskeletal: Negative for arthralgias, joint swelling and myalgias.   Skin: Negative for rash.   Neurological: Negative for dizziness, weakness, headaches and paresthesias.  "  Psychiatric/Behavioral: Negative for mood changes. The patient is not nervous/anxious.           OBJECTIVE:   /82 (BP Location: Right arm, Patient Position: Sitting, Cuff Size: Adult Regular)   Pulse 84   Temp 98.1  F (36.7  C) (Tympanic)   Ht 1.753 m (5' 9\")   Wt 97.7 kg (215 lb 4.8 oz)   SpO2 98%   BMI 31.79 kg/m    Physical Exam  GENERAL: healthy, alert and no distress  EYES: Eyes grossly normal to inspection, PERRL and conjunctivae and sclerae normal  HENT: normal cephalic/atraumatic and ear canals and TM's normal  NECK: no adenopathy, no asymmetry, masses, or scars and thyroid normal to palpation  RESP: lungs clear to auscultation - no rales, rhonchi or wheezes  BREAST: normal without masses, tenderness or nipple discharge and no palpable axillary masses or adenopathy  CV: regular rate and rhythm, normal S1 S2, no S3 or S4, no murmur, click or rub, no peripheral edema and peripheral pulses strong  ABDOMEN: soft, nontender, no hepatosplenomegaly, no masses and bowel sounds normal   (female): normal female external genitalia, normal urethral meatus, vaginal mucosa pink, moist, well rugated, and normal cervix/adnexa/uterus without masses or discharge  MS: no gross musculoskeletal defects noted, no edema  SKIN: no suspicious lesions or rashes  NEURO: Normal strength and tone, mentation intact and speech normal  PSYCH: mentation appears normal, affect normal/bright    Diagnostic Test Results:  Labs reviewed in Epic    ASSESSMENT/PLAN:   (Z00.00) Encounter for routine adult health examination without abnormal findings  (primary encounter diagnosis)  Comment:    Plan: Comprehensive metabolic panel (BMP + Alb, Alk         Phos, ALT, AST, Total. Bili, TP), Lipid panel         reflex to direct LDL Fasting,         drospirenone-ethinyl estradiol (MELISSA) 3-0.02 MG         tablet             (F41.1) Generalized anxiety disorder  Comment:    Plan:      (R23.2) Flushing  Comment:    Plan: propranolol (INDERAL) " "40 MG tablet             (Z12.4) Screening for malignant neoplasm of cervix  Comment:    Plan: Pap screen with HPV - recommended age 30 - 65         years                   COUNSELING:  Reviewed preventive health counseling, as reflected in patient instructions    Estimated body mass index is 31.79 kg/m  as calculated from the following:    Height as of this encounter: 1.753 m (5' 9\").    Weight as of this encounter: 97.7 kg (215 lb 4.8 oz).    Weight management plan: Discussed healthy diet and exercise guidelines    She reports that she has never smoked. She has never used smokeless tobacco.      Counseling Resources:  ATP IV Guidelines  Pooled Cohorts Equation Calculator  Breast Cancer Risk Calculator  BRCA-Related Cancer Risk Assessment: FHS-7 Tool  FRAX Risk Assessment  ICSI Preventive Guidelines  Dietary Guidelines for Americans, 2010  USDA's MyPlate  ASA Prophylaxis  Lung CA Screening    Gin Anne, DO  [facility]  "

## 2022-08-23 LAB
BKR LAB AP GYN ADEQUACY: NORMAL
BKR LAB AP GYN INTERPRETATION: NORMAL
BKR LAB AP HPV REFLEX: NORMAL
BKR LAB AP LMP: NORMAL
BKR LAB AP PREVIOUS ABNORMAL: NORMAL
PATH REPORT.COMMENTS IMP SPEC: NORMAL
PATH REPORT.COMMENTS IMP SPEC: NORMAL
PATH REPORT.RELEVANT HX SPEC: NORMAL

## 2022-08-23 NOTE — RESULT ENCOUNTER NOTE
Dear Benita,   Your test results are all back -   -Cholesterol levels (LDL,HDL, Triglycerides) are borderline but improved!  ADVISE: rechecking in 1 year.   -Liver and gallbladder tests are normal (ALT,AST, Alk phos, bilirubin), kidney function is normal (Cr, GFR), sodium is normal, potassium is normal, calcium is normal, glucose is normal.  Let us know if you have any questions.  -Gin Anne, DO

## 2022-08-25 LAB
HUMAN PAPILLOMA VIRUS 16 DNA: NEGATIVE
HUMAN PAPILLOMA VIRUS 18 DNA: NEGATIVE
HUMAN PAPILLOMA VIRUS FINAL DIAGNOSIS: NORMAL
HUMAN PAPILLOMA VIRUS OTHER HR: NEGATIVE

## 2022-09-03 ENCOUNTER — HEALTH MAINTENANCE LETTER (OUTPATIENT)
Age: 27
End: 2022-09-03

## 2022-09-06 ENCOUNTER — MYC MEDICAL ADVICE (OUTPATIENT)
Dept: FAMILY MEDICINE | Facility: CLINIC | Age: 27
End: 2022-09-06

## 2022-09-07 NOTE — TELEPHONE ENCOUNTER
PN,  Please see below MyChart message and advise.  Pended possible weight loss clinic referral.  Thanks,  Noemy BROUSSARD RN

## 2022-10-17 ENCOUNTER — VIRTUAL VISIT (OUTPATIENT)
Dept: SURGERY | Facility: CLINIC | Age: 27
End: 2022-10-17
Payer: COMMERCIAL

## 2022-10-17 ENCOUNTER — VIRTUAL VISIT (OUTPATIENT)
Dept: FAMILY MEDICINE | Facility: CLINIC | Age: 27
End: 2022-10-17
Payer: COMMERCIAL

## 2022-10-17 DIAGNOSIS — E66.9 OBESITY (BMI 30-39.9): Primary | ICD-10-CM

## 2022-10-17 DIAGNOSIS — E78.2 HYPERCHOLESTEROLEMIA WITH HYPERTRIGLYCERIDEMIA: ICD-10-CM

## 2022-10-17 DIAGNOSIS — E66.09 CLASS 1 OBESITY DUE TO EXCESS CALORIES WITH SERIOUS COMORBIDITY AND BODY MASS INDEX (BMI) OF 32.0 TO 32.9 IN ADULT: Primary | ICD-10-CM

## 2022-10-17 DIAGNOSIS — Z13.228 SCREENING FOR ENDOCRINE, NUTRITIONAL, METABOLIC AND IMMUNITY DISORDER: ICD-10-CM

## 2022-10-17 DIAGNOSIS — Z13.21 SCREENING FOR ENDOCRINE, NUTRITIONAL, METABOLIC AND IMMUNITY DISORDER: ICD-10-CM

## 2022-10-17 DIAGNOSIS — Z13.1 SCREENING FOR DIABETES MELLITUS: ICD-10-CM

## 2022-10-17 DIAGNOSIS — Z13.29 SCREENING FOR ENDOCRINE, NUTRITIONAL, METABOLIC AND IMMUNITY DISORDER: ICD-10-CM

## 2022-10-17 DIAGNOSIS — E66.811 CLASS 1 OBESITY DUE TO EXCESS CALORIES WITH SERIOUS COMORBIDITY AND BODY MASS INDEX (BMI) OF 32.0 TO 32.9 IN ADULT: Primary | ICD-10-CM

## 2022-10-17 DIAGNOSIS — L70.9 ACNE, UNSPECIFIED ACNE TYPE: ICD-10-CM

## 2022-10-17 DIAGNOSIS — Z13.0 SCREENING FOR ENDOCRINE, NUTRITIONAL, METABOLIC AND IMMUNITY DISORDER: ICD-10-CM

## 2022-10-17 PROBLEM — B00.1 COLD SORE: Status: RESOLVED | Noted: 2020-08-18 | Resolved: 2022-10-17

## 2022-10-17 PROCEDURE — 97802 MEDICAL NUTRITION INDIV IN: CPT | Mod: GT | Performed by: DIETITIAN, REGISTERED

## 2022-10-17 PROCEDURE — 99215 OFFICE O/P EST HI 40 MIN: CPT | Mod: GT | Performed by: FAMILY MEDICINE

## 2022-10-17 RX ORDER — TOPIRAMATE 25 MG/1
50 TABLET, FILM COATED ORAL
Qty: 60 TABLET | Refills: 1 | Status: SHIPPED | OUTPATIENT
Start: 2022-10-17 | End: 2022-12-02

## 2022-10-17 RX ORDER — PHENTERMINE HYDROCHLORIDE 15 MG/1
15 CAPSULE ORAL EVERY MORNING
Qty: 30 CAPSULE | Refills: 0 | Status: SHIPPED | OUTPATIENT
Start: 2022-10-17 | End: 2022-11-15

## 2022-10-17 NOTE — PROGRESS NOTES
Type of service:  Video Visit    Benita Robbins is a 27 year old female who is being evaluated via a billable video visit.      How would you like to obtain your AVS? Charliehart  If dropped from the video visit, the video invitation should be resent by: [phone]  Will anyone else be joining your video visit? No    Video Start Time: 12:04 PM  Video End Time (time video stopped): 12:51 PM  Originating Location (pt. Location): Home  Distant Location (provider location):  on-site  Platform used for Video Visit: [facility] Weight Management Consult    PATIENT:  Benita Robbins  MRN:         [id number]  :         1995  CRISTAL:         10/17/2022    Dear Dr. Anne,    I had the pleasure of seeing your patient, Benita Robbins. Full intake/assessment was done to determine barriers to weight loss success and develop a treatment plan. Benita Robbins is a 27 year old female interested in treatment of medical problems associated with excess weight. She has a height of Data Unavailable, a weight of 0 lbs 0 oz, and the calculated There is no height or weight on file to calculate BMI.       ASSESSMENT/PLAN:    Class 1 obesity due to excess calories with serious comorbidity and body mass index (BMI) of 32.0 to 32.9 in adult  27 year old year old female in clinic today to discuss treatment of the following conditions through diet and lifestyle modification and weight loss:  1. Class 1 obesity due to excess calories with serious comorbidity and body mass index (BMI) of 32.0 to 32.9 in adult    2. Hypercholesterolemia with hypertriglyceridemia    3. Acne, unspecified acne type    4. Screening for diabetes mellitus    5. Screening for endocrine, nutritional, metabolic and immunity disorder      Intake: 27-year-old woman with history of irregular periods on oral contraception for 10+ years, hypertriglyceridemia, acne presenting for discussion of medical weight loss.  She describes herself as being  "physically active multiple days per week using a multi exercise modality framework.  She is very conscientious with respect to nutrition.  Despite her efforts, she finds her self gaining weight.  She is attentive to sleep although there is some concern for snoring.  She works long hours.  We had a lengthy conversation regarding different factors that may contribute to weight gain.  I believe there is some degree of hypercortisolemia that might be a factor.  I also wonder about insulin resistance given the recalcitrant nature of weight gain, hypertriglyceridemia and her distant history of dysfunctional uterine bleeding.  She does not have a diagnosis of PCOS.  Evaluate for insulin resistance using fasting glucose, fasting insulin and Rebecca-IR ratio.  Recheck thyroid.  - We discussed various pharmaceutical measures.  GLP-1 receptor agonists would be possible but quite expensive for this individual.  We discussed potential side effects of phentermine/topiramate.  This was prescribed with a goal of reducing overall intake of food and ease of executing her nutrition plan.  We discussed that this is a pregnancy category X medication.  She is on hormonal contraception with no plans to conceive in the near future.  She states that she is a goal of getting off of these medicines as fast as possible.  - If she is struggling I would suggest a medicine such as liraglutide or semaglutide.  - Follow-up 6 weeks.    FOLLOW-UP:   6 weeks.    SUBJECTIVE:     She has the following co-morbidities:       10/11/2022   I have the following health issues associated with obesity: High Cholesterol, Stress Incontinence   I have the following symptoms associated with obesity: Depression, Fatigue     Narrative History:    - daily exercise: she is active in [employer]. She may switch to [employer].    - slowly gaining weight   - nutrition: she tries to eat well. She tries to focus on protein.     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      Patient Goals 10/11/2022   I am interested in having a healthier weight to diminish current health problems: Yes   I am interested in having a healthier weight in order to prevent future health problems: Yes   I am interested in having a healthier weight in order to have a future surgery: No     Referring Provider 10/11/2022   Please name the provider who referred you to Medical Weight Management.  If you do not know, please answer: \"I Don't Know\". Dr. Gin Anne     Weight History 10/11/2022   How concerned are you about your weight? Very Concerned   Would you describe your weight gain as gradual? Yes   I became overweight: In College   The following factors have contributed to my weight gain:  Eating Too Much, Stress   I have tried the following methods to lose weight: Watching Portions or Calories, Exercise, Weight Watchers, Fasting   My lowest weight since age 16 was: 165   My highest weight since age 16 was: 210   The most weight I have ever lost was: (lbs) 210   I have the following family history of obesity/being overweight:  My father is overweight   Has anyone in your family had weight loss surgery? No   How has your weight changed over the last year?  Gained       Diet Recall Review with Patient 10/11/2022   Do you typically eat breakfast? Yes   If you do eat breakfast, what types of food do you eat? Eggs or yogurt   Do you typically eat lunch? Yes   If you do eat lunch, what types of food do you typically eat?  Varies. Salads or leftovers   Do you typically eat supper? Yes   If you do eat supper, what types of food do you typically eat? Varies   Do you typically eat snacks? No   Do you like vegetables?  Yes   Do you drink water? Yes   How many glasses of juice do you drink in a typical day? 0   How many of glasses of milk do you drink in a typical day? 0   If you do drink milk, what type? N/A   How often do you have a drink of alcohol? 2-4 Times a " Month   If you do drink, how many drinks might you have in a day? 3-4     Eating Habits 10/11/2022   Generally, my meals include foods like these: bread, pasta, rice, potatoes, corn, crackers, sweet dessert, pop, or juice. A Few Times a Week   Generally, my meals include foods like these: fried meats, brats, burgers, french fries, pizza, cheese, chips, or ice cream. Less Than Weekly   Eat fast food (like [employer], [employer], [employer]aco [employer]). Never   Eat at a buffet or sit-down restaurant. Never   Eat most of my meals in front of the TV or computer. Less Than Weekly   Often skip meals, eat at random times, have no regular eating times. A Few Times a Week   Rarely sit down for a meal but snack or graze throughout.  Less Than Weekly   Eat extra snacks between meals. Less Than Weekly   Eat most of my food at the end of the day. Never   Eat in the middle of the night or wake up at night to eat. Never   Eat extra snacks to prevent or correct low blood sugar. Never   Eat to prevent acid reflux or stomach pain. Never   Worry about not having enough food to eat. Never   Have you been to the food shelf at least a few times this year? No   I eat when I am depressed. Less Than Weekly   I eat when I am stressed. A Few Times a Week   I eat when I am bored. A Few Times a Week   I eat when I am anxious. A Few Times a Week   I eat when I am happy or as a reward. A Few Times a Week   I feel hungry all the time even if I just have eaten. A Few Times a Week   Feeling full is important to me. Everyday   I finish all the food on my plate even if I am already full. Everyday   I can't resist eating delicious food or walk past the good food/smell. A Few Times a Week   I eat/snack without noticing that I am eating. Never   I eat when I am preparing the meal. Never   I eat more than usual when I see others eating. Never   I have trouble not eating sweets, ice cream, cookies, or chips if they are around the house. A Few Times a Week   I think  about food all day. Never   Please list any other foods you crave? Breads       Amount of Food 10/11/2022   I make myself vomit what I have eaten or use laxatives to get rid of food. Never   I eat a large amount of food, like a loaf of bread, a box of cookies, a pint/quart of ice cream, all at once. Never   I eat a large amount of food even when I am not hungry. Never   I eat rapidly. Almost Everyday   I eat alone because I feel embarrassed and do not want others to see how much I have eaten. Never   I eat until I am uncomfortably full. Monthly   I feel bad, disgusted, or guilty after I overeat. Monthly   I make myself vomit what I have eaten or use laxatives to get rid of food. Never       Activity/Exercise History 10/11/2022   How much of a typical 12 hour day do you spend sitting? Most of the Day   How much of a typical 12 hour day do you spend lying down? Less Than Half the Day   How much of a typical day do you spend walking/standing? Less Than Half the Day   How many hours (not including work) do you spend on the TV/Video Games/Computer/Tablet/Phone? 1 Hour or Less   How many times a week are you active for the purpose of exercise? 6-7 Times a Week   How many total minutes do you spend doing some activity for the purpose of exercising when you exercise? More Than 30 Minutes       PAST MEDICAL HISTORY:  History reviewed. No pertinent past medical history.    Work/Social History Reviewed With Patient 10/11/2022   My employment status is: Full-Time   My job is:    How much of your job is spent on the computer or phone? 100%   How many hours do you spend commuting to work daily?  45 mib   What is your marital status? /In a Relationship   If in a relationship, is your significant other overweight? No   Do you have children? No   If you have children, are they overweight? N/A   Who do you live with?  Boyfriend   Are they supportive of your health goals? Yes   Who does the food shopping?   Both of        Mental Health History Reviewed With Patient 10/11/2022   Have you ever been physically or sexually abused? No   If yes, do you feel that the abuse is affecting your weight? N/A   If yes, would you like to talk to a counselor about the abuse? N/A   How often in the past 2 weeks have you felt little interest or pleasure in doing things? For Several Days   Over the past 2 weeks how often have you felt down, depressed, or hopeless? For Several Days       Sleep History Reviewed With Patient 10/11/2022   How many hours do you sleep at night? 8   Do you think that you snore loudly or has anybody ever heard you snore loudly (louder than talking or so loud it can be heard behind a shut door)? Yes   Has anyone seen or heard you stop breathing during your sleep? No   Do you often feel tired, fatigued, or sleepy during the day? Yes   Do you have a TV/Computer in your bedroom? No     MEDICATIONS:   Current Outpatient Medications   Medication Sig Dispense Refill     drospirenone-ethinyl estradiol (MELSISA) 3-0.02 MG tablet Take 1 tablet by mouth daily 84 tablet 4     LORazepam (ATIVAN) 0.5 MG tablet Take 1 tablet (0.5 mg) by mouth every 8 hours as needed for anxiety 15 tablet 0     ondansetron (ZOFRAN ODT) 4 MG ODT tab DISSOLVE 1 TABLET(4 MG) ON THE TONGUE EVERY 8 HOURS AS NEEDED FOR NAUSEA 15 tablet 0     phentermine (ADIPEX-P) 15 MG capsule Take 1 capsule (15 mg) by mouth every morning 30 capsule 0     propranolol (INDERAL) 40 MG tablet Take 1 tablet (40 mg) by mouth every 12 hours as needed (anxiety) 60 tablet 3     spironolactone (ALDACTONE) 100 MG tablet Take 1 tablet (100 mg) by mouth daily       topiramate (TOPAMAX) 25 MG tablet Take 2 tablets (50 mg) by mouth daily (with dinner) (for the first week, take 1 tab with dinner and increase as tolerated) 60 tablet 1     traZODone (DESYREL) 50 MG tablet Take 0.5-1 tablets (25-50 mg) by mouth At Bedtime 30 tablet 1       ALLERGIES:   No Known Allergies    PHYSICAL  EXAM:  Physical Exam  Nursing note reviewed.   Constitutional:       General: She is not in acute distress.     Appearance: Normal appearance. She is not ill-appearing.   HENT:      Head: Normocephalic and atraumatic.   Eyes:      Extraocular Movements: Extraocular movements intact.      Conjunctiva/sclera: Conjunctivae normal.   Pulmonary:      Effort: Pulmonary effort is normal.   Neurological:      Mental Status: She is alert and oriented to person, place, and time.   Psychiatric:         Attention and Perception: Attention normal.         Mood and Affect: Mood normal.         Speech: Speech normal.         Thought Content: Thought content normal.       55 minutes spent on the date of the encounter doing chart review, history and exam, documentation and further activities per the note    This note has been dictated using voice recognition software. Any grammatical or context distortions are unintentional and inherent to the software    Sincerely,    Anil Rao MD

## 2022-10-17 NOTE — PROGRESS NOTES
"Benita Robbins is a 27 year old who is being evaluated via a billable video visit.      How would you like to obtain your AVS? MyChart  If the video visit is dropped, the invitation should be resent by: Send to e-mail at: [email or web address]  Will anyone else be joining your video visit? No      Video Start Time: 3:30 pm      Medical Weight Loss Initial Diet Evaluation  Assessment:  Benita is presenting today for a new weight management nutrition consultation. Pt has had an initial appointment with Dr. Taylor.  Weight loss medication: Phentermine and Topiramate    Personal Goals: Weight gain over the last few years    Loves food, cooking, wellness, reading about nutrition      Anthropometrics:  Initial weight: 215 lb  BMI: There is no height or weight on file to calculate BMI.   Ideal body weight: 66.2 kg (145 lb 15.1 oz)  Adjusted ideal body weight: 78.8 kg (173 lb 11 oz)  Estimated RMR ([place] equation):  1767 kcals x 1.2 (sedentary) = 2120 kcals (for weight maintenance)  1767 kcals x 1.3 (light active) = 2429 kcals (for weight maintenance)  1767 kcals x 1.3 (mod active) = 2738 kcals (for weight maintenance)    Recommended Protein Intake: 130 grams of protein/day (35% of 2100 kcals)    Estimated energy needs for weight loss: 2100 kcals    Medical History:  Patient Active Problem List   Diagnosis     Encounter for routine adult health examination without abnormal findings     Generalized anxiety disorder     Hypercholesterolemia with hypertriglyceridemia     Acne, unspecified acne type     Class 1 obesity due to excess calories with serious comorbidity and body mass index (BMI) of 32.0 to 32.9 in adult      Diabetes: No  HbA1c:  No results found for: HGBA1C    Nutrition History:   Weight loss history: Noom, weight watchers  Likes tracking    Dietary Recall:  Works out in the morning - doesn't like the fasting diet approach for this reason  Breakfast: egg white cups  Lunch: \"falls off the " "wagon at this meal\" - rice, veggie, protein (meal prepped this visit   Dinner: Protein, veggies  Typical Snacks: nuts (handful) or rice    Beverages:   Water  Maybe a cup of coffee    Soda: diet coke maybe on the weekend    Some Alcohol on weekends    Exercise:   she is active in [employer], 6-7 days per week. She may switch to [employer].     Nutrition Diagnosis (PES statement):   Overweight/Obesity (NC 3.3) related to excessive calorie intake as evidenced by BMI 31.79    Nutrition Intervention  1. Food and/or Nutrient Delivery   a. Placed emphasis on importance of developing a healthy meal routine, aiming for 3 meals a day  2. Nutrition Education   a. Discussed with patient how to build a meal: the importance of including a lean/low fat protein at each meal, include a source of vegetables at a minimum of lunch and dinner and limiting carbohydrate intake to 180 g per day  b. Educated on sources of lean protein, portion sizes, the amount of grams found in each source. Recommend patient to aim for 30-40 g protein at each meal.  3. Nutrition Counseling   a. Encouraged importance of developing routine exercise for health benefits and weight loss.  b. Discussed mindful eating techniques such as eating off smaller places/bowls, taking 20-30 minutes to eat in a calm/relaxed environment without distractions.    Goals established by patient:   1. Track protein and carbs (130 g protein - 25%, 180 g carbs - 35% of calories, calories at 2100 kcals)    Handouts provided:  Quick Meals  Carb Counting Info  Apps Info    Assessment/Plan:    Pt will follow up in 2 month(s) with bariatrician and 2 month(s) with dietitian.     Video-Visit Details    Type of service:  Video Visit    Video End Time:4:00 pm    Originating Location (pt. Location): Home    Distant Location (provider location):  [facility] SURGERY CLINIC AND BARIATRICS CARE [place]     Platform used for Video Visit: Aly Hernandez  "

## 2022-10-17 NOTE — ASSESSMENT & PLAN NOTE
27 year old year old female in clinic today to discuss treatment of the following conditions through diet and lifestyle modification and weight loss:  1. Class 1 obesity due to excess calories with serious comorbidity and body mass index (BMI) of 32.0 to 32.9 in adult    2. Hypercholesterolemia with hypertriglyceridemia    3. Acne, unspecified acne type    4. Screening for diabetes mellitus    5. Screening for endocrine, nutritional, metabolic and immunity disorder      Intake: 27-year-old woman with history of irregular periods on oral contraception for 10+ years, hypertriglyceridemia, acne presenting for discussion of medical weight loss.  She describes herself as being physically active multiple days per week using a multi exercise modality framework.  She is very conscientious with respect to nutrition.  Despite her efforts, she finds her self gaining weight.  She is attentive to sleep although there is some concern for snoring.  She works long hours.  We had a lengthy conversation regarding different factors that may contribute to weight gain.  I believe there is some degree of hypercortisolemia that might be a factor.  I also wonder about insulin resistance given the recalcitrant nature of weight gain, hypertriglyceridemia and her distant history of dysfunctional uterine bleeding.  She does not have a diagnosis of PCOS.  Evaluate for insulin resistance using fasting glucose, fasting insulin and Rebecca-IR ratio.  Recheck thyroid.  - We discussed various pharmaceutical measures.  GLP-1 receptor agonists would be possible but quite expensive for this individual.  We discussed potential side effects of phentermine/topiramate.  This was prescribed with a goal of reducing overall intake of food and ease of executing her nutrition plan.  We discussed that this is a pregnancy category X medication.  She is on hormonal contraception with no plans to conceive in the near future.  She states that she is a goal of getting  off of these medicines as fast as possible.  - If she is struggling I would suggest a medicine such as liraglutide or semaglutide.  - Follow-up 6 weeks.

## 2022-10-17 NOTE — LETTER
10/17/2022         RE: Benita Robbins  [address]  [place] [address]        Dear Colleague,    Thank you for referring your patient, Benita Robbins, to the [facility]ATRICS CARE [place]. Please see a copy of my visit note below.    Benita Robbins is a 27 year old who is being evaluated via a billable video visit.      How would you like to obtain your AVS? MyChart  If the video visit is dropped, the invitation should be resent by: Send to e-mail at: [email or web address]  Will anyone else be joining your video visit? No      Video Start Time: 3:30 pm      Medical Weight Loss Initial Diet Evaluation  Assessment:  Benita is presenting today for a new weight management nutrition consultation. Pt has had an initial appointment with Dr. Taylor.  Weight loss medication: Phentermine and Topiramate    Personal Goals: Weight gain over the last few years    Loves food, cooking, wellness, reading about nutrition      Anthropometrics:  Initial weight: 215 lb  BMI: There is no height or weight on file to calculate BMI.   Ideal body weight: 66.2 kg (145 lb 15.1 oz)  Adjusted ideal body weight: 78.8 kg (173 lb 11 oz)  Estimated RMR ([place] Jeor equation):  1767 kcals x 1.2 (sedentary) = 2120 kcals (for weight maintenance)  1767 kcals x 1.3 (light active) = 2429 kcals (for weight maintenance)  1767 kcals x 1.3 (mod active) = 2738 kcals (for weight maintenance)    Recommended Protein Intake: 130 grams of protein/day (35% of 2100 kcals)    Estimated energy needs for weight loss: 2100 kcals    Medical History:  Patient Active Problem List   Diagnosis     Encounter for routine adult health examination without abnormal findings     Generalized anxiety disorder     Hypercholesterolemia with hypertriglyceridemia     Acne, unspecified acne type     Class 1 obesity due to excess calories with serious comorbidity and body mass index (BMI) of 32.0 to 32.9 in adult  "     Diabetes: No  HbA1c:  No results found for: HGBA1C    Nutrition History:   Weight loss history: Noom, weight watchers  Likes tracking    Dietary Recall:  Works out in the morning - doesn't like the fasting diet approach for this reason  Breakfast: egg white cups  Lunch: \"falls off the wagon at this meal\" - rice, veggie, protein (meal prepped this visit   Dinner: Protein, veggies  Typical Snacks: nuts (handful) or rice    Beverages:   Water  Maybe a cup of coffee    Soda: diet coke maybe on the weekend    Some Alcohol on weekends    Exercise:   she is active in [employer], 6-7 days per week. She may switch to [employer].     Nutrition Diagnosis (PES statement):   Overweight/Obesity (NC 3.3) related to excessive calorie intake as evidenced by BMI 31.79    Nutrition Intervention  1. Food and/or Nutrient Delivery   a. Placed emphasis on importance of developing a healthy meal routine, aiming for 3 meals a day  2. Nutrition Education   a. Discussed with patient how to build a meal: the importance of including a lean/low fat protein at each meal, include a source of vegetables at a minimum of lunch and dinner and limiting carbohydrate intake to 180 g per day  b. Educated on sources of lean protein, portion sizes, the amount of grams found in each source. Recommend patient to aim for 30-40 g protein at each meal.  3. Nutrition Counseling   a. Encouraged importance of developing routine exercise for health benefits and weight loss.  b. Discussed mindful eating techniques such as eating off smaller places/bowls, taking 20-30 minutes to eat in a calm/relaxed environment without distractions.    Goals established by patient:   1. Track protein and carbs (130 g protein - 25%, 180 g carbs - 35% of calories, calories at 2100 kcals)    Handouts provided:  Quick Meals  Carb Counting Info  Apps Info    Assessment/Plan:    Pt will follow up in 2 month(s) with bariatrician and 2 month(s) with dietitian.     Video-Visit " Details    Type of service:  Video Visit    Video End Time:4:00 pm    Originating Location (pt. Location): Home    Distant Location (provider location):  [facility] SURGERY CLINIC AND BARIATRICS CARE [place]     Platform used for Video Visit: Aly Hernandez      Again, thank you for allowing me to participate in the care of your patient.        Sincerely,        Zayra Hernandez

## 2022-10-17 NOTE — PATIENT INSTRUCTIONS
Macronutrient Goals    Minimum 3 meals per day, control daily calories   - 1200 female   - 1600 male  Minimum of 4 palm servings of protein daily.  Begin everyday with protein    - ~ gm for female   - 120 gm for male  Be mindful of net carbs  50-75 gm/day  - (Total carbs - fiber)   - Less than 5 gm of carbs with breakfast

## 2022-10-20 ENCOUNTER — MYC MEDICAL ADVICE (OUTPATIENT)
Dept: FAMILY MEDICINE | Facility: CLINIC | Age: 27
End: 2022-10-20

## 2022-10-21 ENCOUNTER — LAB (OUTPATIENT)
Dept: LAB | Facility: CLINIC | Age: 27
End: 2022-10-21
Attending: FAMILY MEDICINE
Payer: COMMERCIAL

## 2022-10-21 DIAGNOSIS — Z13.21 SCREENING FOR ENDOCRINE, NUTRITIONAL, METABOLIC AND IMMUNITY DISORDER: ICD-10-CM

## 2022-10-21 DIAGNOSIS — Z13.29 SCREENING FOR ENDOCRINE, NUTRITIONAL, METABOLIC AND IMMUNITY DISORDER: ICD-10-CM

## 2022-10-21 DIAGNOSIS — Z13.228 SCREENING FOR ENDOCRINE, NUTRITIONAL, METABOLIC AND IMMUNITY DISORDER: ICD-10-CM

## 2022-10-21 DIAGNOSIS — Z13.1 SCREENING FOR DIABETES MELLITUS: ICD-10-CM

## 2022-10-21 DIAGNOSIS — Z13.0 SCREENING FOR ENDOCRINE, NUTRITIONAL, METABOLIC AND IMMUNITY DISORDER: ICD-10-CM

## 2022-10-21 LAB
FASTING STATUS PATIENT QL REPORTED: YES
GLUCOSE BLD-MCNC: 107 MG/DL (ref 70–99)
INSULIN SERPL-ACNC: 25.1 UU/ML (ref 2.6–24.9)
TSH SERPL DL<=0.005 MIU/L-ACNC: 1.67 MU/L (ref 0.4–4)

## 2022-10-21 PROCEDURE — 83525 ASSAY OF INSULIN: CPT

## 2022-10-21 PROCEDURE — 84443 ASSAY THYROID STIM HORMONE: CPT

## 2022-10-21 PROCEDURE — 82947 ASSAY GLUCOSE BLOOD QUANT: CPT

## 2022-10-21 PROCEDURE — 36415 COLL VENOUS BLD VENIPUNCTURE: CPT

## 2022-10-24 PROBLEM — E88.810 METABOLIC SYNDROME X: Status: ACTIVE | Noted: 2022-10-24

## 2022-10-24 PROBLEM — E16.1 HYPERINSULINEMIA: Status: ACTIVE | Noted: 2022-10-24

## 2022-11-01 ENCOUNTER — OFFICE VISIT (OUTPATIENT)
Dept: OBGYN | Facility: CLINIC | Age: 27
End: 2022-11-01
Payer: COMMERCIAL

## 2022-11-01 VITALS — DIASTOLIC BLOOD PRESSURE: 84 MMHG | SYSTOLIC BLOOD PRESSURE: 126 MMHG | BODY MASS INDEX: 31.01 KG/M2 | WEIGHT: 210 LBS

## 2022-11-01 DIAGNOSIS — Z86.2 HISTORY OF ANEMIA: ICD-10-CM

## 2022-11-01 DIAGNOSIS — R68.82 DECREASED LIBIDO: ICD-10-CM

## 2022-11-01 DIAGNOSIS — N94.10 DYSPAREUNIA IN FEMALE: Primary | ICD-10-CM

## 2022-11-01 PROBLEM — Z00.00 ENCOUNTER FOR ROUTINE ADULT HEALTH EXAMINATION WITHOUT ABNORMAL FINDINGS: Status: RESOLVED | Noted: 2020-08-18 | Resolved: 2022-11-01

## 2022-11-01 LAB
ERYTHROCYTE [DISTWIDTH] IN BLOOD BY AUTOMATED COUNT: 11.7 % (ref 10–15)
FERRITIN SERPL-MCNC: 165 NG/ML (ref 12–150)
HCT VFR BLD AUTO: 39.3 % (ref 35–47)
HGB BLD-MCNC: 13.1 G/DL (ref 11.7–15.7)
IRON SATN MFR SERPL: 29 % (ref 15–46)
IRON SERPL-MCNC: 110 UG/DL (ref 35–180)
MCH RBC QN AUTO: 30.5 PG (ref 26.5–33)
MCHC RBC AUTO-ENTMCNC: 33.3 G/DL (ref 31.5–36.5)
MCV RBC AUTO: 91 FL (ref 78–100)
PLATELET # BLD AUTO: 359 10E3/UL (ref 150–450)
RBC # BLD AUTO: 4.3 10E6/UL (ref 3.8–5.2)
TIBC SERPL-MCNC: 375 UG/DL (ref 240–430)
WBC # BLD AUTO: 6.8 10E3/UL (ref 4–11)

## 2022-11-01 PROCEDURE — 85027 COMPLETE CBC AUTOMATED: CPT | Performed by: OBSTETRICS & GYNECOLOGY

## 2022-11-01 PROCEDURE — 82728 ASSAY OF FERRITIN: CPT | Performed by: OBSTETRICS & GYNECOLOGY

## 2022-11-01 PROCEDURE — 99204 OFFICE O/P NEW MOD 45 MIN: CPT | Performed by: OBSTETRICS & GYNECOLOGY

## 2022-11-01 PROCEDURE — 83540 ASSAY OF IRON: CPT | Performed by: OBSTETRICS & GYNECOLOGY

## 2022-11-01 PROCEDURE — 83550 IRON BINDING TEST: CPT | Performed by: OBSTETRICS & GYNECOLOGY

## 2022-11-01 PROCEDURE — 36415 COLL VENOUS BLD VENIPUNCTURE: CPT | Performed by: OBSTETRICS & GYNECOLOGY

## 2022-11-01 NOTE — PROGRESS NOTES
Subjective  27 year old non-pregnant female presents today complaining of dyspareunia, decreased libido, and hair loss.  Patient states dyspareunia and decreased libido have been going on for approximately a year.  She has been sexually active with the same partner for over 3 years.  Nothing has changed as far as intercourse for her.  No history of trauma or abuse.  She has not used any lubricants.  The pain with intercourse is mostly with deep penetration.  Rare spotting after intercourse.  She had a normal Pap smear in August of this year.  Her menses are regular, monthly.  She is on an oral contractive pills.  Even when not taking OCPs, her menses are regular.  No pelvic pain.  No pregnancies.  Patient has gained weight over the last year as well.  She has been working with a dietician.  She wonders if the weight gain has contributed to her decreased libido.  We discussed this in detail.  Patient is taking a diet pill now and has been for the last few weeks.  She has lost 10lbs.  She has also been watching what she is eating as well.  Patient feels she may have PCOS.  No abnormal male pattern hair growth.  We discussed PCOS in detail.  Patient has a follow-up appointment with endocrinology to discuss her hair loss.  She has noticed this for the last few months.  Patient has a history of anemia and is requesting labs today.      I also reviewed notes from previous office visits by her family practice provider and the registered dietitian.        ROS: 10 point ROS neg other than the symptoms noted above in the HPI.  History reviewed. No pertinent past medical history.  Past Surgical History:   Procedure Laterality Date     DENTAL SURGERY      [place] teeth extraction     Family History   Problem Relation Age of Onset     Alzheimer Disease Paternal Grandmother      Alcoholism Paternal Grandmother      Depression Paternal Grandmother      Mental Illness Paternal Grandmother      Substance Abuse Paternal Grandmother       Coronary Artery Disease Paternal Grandfather      Alcoholism Paternal Grandfather      Substance Abuse Paternal Grandfather      Hyperlipidemia Mother      Hyperlipidemia Maternal Grandmother      Social History     Tobacco Use     Smoking status: Never     Smokeless tobacco: Never   Substance Use Topics     Alcohol use: Yes     Comment: soc- wine         Objective  Vitals: /84 (BP Location: Right arm, Cuff Size: Adult Regular)   Wt 95.3 kg (210 lb)   LMP 10/11/2022   BMI 31.01 kg/m    BMI= Body mass index is 31.01 kg/m .    General appearance=well developed, well-nourished female  Gait=normal  Psych=mood is stable, alert and oriented x3  Abd=soft, Nontender/nondistended, no masses, no signs of hernias, no evidence of hepatosplenomegaly  PELVIC:    External genitalia: normal without lesions or masses, pain with palpation-vulvodynia suspected  Urethral meatus: no lesions or prolapse noted, normal size  Urethra: no masses, non tender  Bladder: non tender, no fullness  Vagina: normal mucosa and rugae, no discharge.  Cervix: normal without lesion, no cervical motion tenderness, healthy, nulliparous  Uterus: small, mobile, nontender.  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  Nursing notes read and reviewed    Debby Salazar DO

## 2022-11-01 NOTE — PATIENT INSTRUCTIONS
Please call if you any questions.    [facility]  [address] [place]   [address]  [phone]        Debby Salazar,

## 2022-11-14 DIAGNOSIS — E66.09 CLASS 1 OBESITY DUE TO EXCESS CALORIES WITH SERIOUS COMORBIDITY AND BODY MASS INDEX (BMI) OF 32.0 TO 32.9 IN ADULT: ICD-10-CM

## 2022-11-14 DIAGNOSIS — E78.2 HYPERCHOLESTEROLEMIA WITH HYPERTRIGLYCERIDEMIA: ICD-10-CM

## 2022-11-14 DIAGNOSIS — E66.811 CLASS 1 OBESITY DUE TO EXCESS CALORIES WITH SERIOUS COMORBIDITY AND BODY MASS INDEX (BMI) OF 32.0 TO 32.9 IN ADULT: ICD-10-CM

## 2022-11-15 RX ORDER — PHENTERMINE HYDROCHLORIDE 15 MG/1
CAPSULE ORAL
Qty: 30 CAPSULE | Refills: 0 | Status: SHIPPED | OUTPATIENT
Start: 2022-11-15 | End: 2022-12-02

## 2022-12-02 ENCOUNTER — VIRTUAL VISIT (OUTPATIENT)
Dept: FAMILY MEDICINE | Facility: CLINIC | Age: 27
End: 2022-12-02
Attending: FAMILY MEDICINE
Payer: COMMERCIAL

## 2022-12-02 ENCOUNTER — VIRTUAL VISIT (OUTPATIENT)
Dept: URGENT CARE | Facility: CLINIC | Age: 27
End: 2022-12-02
Payer: COMMERCIAL

## 2022-12-02 DIAGNOSIS — E78.2 HYPERCHOLESTEROLEMIA WITH HYPERTRIGLYCERIDEMIA: ICD-10-CM

## 2022-12-02 DIAGNOSIS — E88.810 METABOLIC SYNDROME X: ICD-10-CM

## 2022-12-02 DIAGNOSIS — E16.1 HYPERINSULINEMIA: ICD-10-CM

## 2022-12-02 DIAGNOSIS — R07.0 THROAT PAIN: ICD-10-CM

## 2022-12-02 DIAGNOSIS — E66.09 CLASS 1 OBESITY DUE TO EXCESS CALORIES WITH SERIOUS COMORBIDITY AND BODY MASS INDEX (BMI) OF 32.0 TO 32.9 IN ADULT: Primary | ICD-10-CM

## 2022-12-02 DIAGNOSIS — E66.811 CLASS 1 OBESITY DUE TO EXCESS CALORIES WITH SERIOUS COMORBIDITY AND BODY MASS INDEX (BMI) OF 32.0 TO 32.9 IN ADULT: Primary | ICD-10-CM

## 2022-12-02 DIAGNOSIS — R05.1 ACUTE COUGH: ICD-10-CM

## 2022-12-02 DIAGNOSIS — R09.81 CONGESTION OF PARANASAL SINUS: ICD-10-CM

## 2022-12-02 DIAGNOSIS — J00 COMMON COLD VIRUS: Primary | ICD-10-CM

## 2022-12-02 PROCEDURE — 99213 OFFICE O/P EST LOW 20 MIN: CPT | Mod: GT | Performed by: FAMILY MEDICINE

## 2022-12-02 PROCEDURE — 99213 OFFICE O/P EST LOW 20 MIN: CPT | Mod: GT

## 2022-12-02 RX ORDER — PHENTERMINE HYDROCHLORIDE 15 MG/1
CAPSULE ORAL
Qty: 90 CAPSULE | Refills: 0 | Status: SHIPPED | OUTPATIENT
Start: 2022-12-02 | End: 2023-02-20

## 2022-12-02 ASSESSMENT — ENCOUNTER SYMPTOMS: CONSTITUTIONAL NEGATIVE: 1

## 2022-12-02 NOTE — PROGRESS NOTES
Benita is a 27 year old who is being evaluated via a billable video visit.      How would you like to obtain your AVS? MyChart  If the video visit is dropped, the invitation should be resent by: Text to cell phone: [phone]  Will anyone else be joining your video visit? No        Assessment & Plan     Common cold virus      Acute cough      Congestion of paranasal sinus      Throat pain    Patient will continue conservative home care treatment, rest fluids May take Tylenol or ibuprofen as needed for discomfort or mild fever.  She will continue over-the-counter decongestant such as Zyrtec or Claritin.  Also discussed using saline lavage or [place] pot to help with sinus congestion May use Afrin for short period of time recommend no longer than 5 days once daily would recommend this prior to bedtime.  Discussed symptoms that would warrant in person evaluation such as shortness of breath, respiratory depression, high fever patient verbalized understanding.      [facility] Goodwin [facility]    Subjective   Benita is a 27 year old , presenting for the following health issues:  Patient with approximately 3 to 4-day history of symptoms cough, sinus congestion, malaise.  Denies fever or shortness of breath.  States cough is dry nonproductive.  Clear sinus drainage.  She has been using over-the-counter treatment such as NyQuil Tylenol and or ibuprofen along with rest and fluids.    HPI   See above     Review of Systems   Constitutional, HEENT, cardiovascular, pulmonary, gi and gu systems are negative, except as otherwise noted.      Objective             Physical Exam   GENERAL: alert and no distress  EYES: Eyes grossly normal to inspection.  No discharge or erythema, or obvious scleral/conjunctival abnormalities.  RESP: No audible wheeze, cough, or visible cyanosis.  No visible retractions or increased work of breathing.    SKIN: Visible skin clear. No significant rash, abnormal  pigmentation or lesions.  NEURO: Cranial nerves grossly intact.  Mentation and speech appropriate for age.  PSYCH: Mentation appears normal, affect normal/bright, judgement and insight intact, normal speech and appearance well-groomed.      Video-Visit Details    Video Start Time: 1430    Type of service:  Video Visit    Video End Time:2:45 PM    Originating Location (pt. Location): Home        Distant Location (provider location):  Off-site    Platform used for Video Visit: Aly

## 2022-12-02 NOTE — PROGRESS NOTES
Benita is a 27 year old who is being evaluated via a billable video visit.      How would you like to obtain your AVS? MyChart  If the video visit is dropped, the invitation should be resent by: Text to cell phone: [phone]  Will anyone else be joining your video visit? No    Video-Visit Details    Video Start Time: 1:37 PM    Type of service:  Video Visit    Video End Time:2:01 PM    Originating Location (pt. Location): Home    Distant Location (provider location):  On-site    Platform used for Video Visit: Ogin    Problem List Items Addressed This Visit     Class 1 obesity due to excess calories with serious comorbidity and body mass index (BMI) of 32.0 to 32.9 in adult - Primary     27 year old year old female in clinic today to discuss treatment of the following conditions through diet and lifestyle modification and weight loss:  1. Class 1 obesity due to excess calories with serious comorbidity and body mass index (BMI) of 32.0 to 32.9 in adult    2. Hypercholesterolemia with hypertriglyceridemia    3. Hyperinsulinemia    4. Metabolic syndrome X      The patient's weight loss result since the last visit was successful based on weight loss.  She is leading with protein and exercise. I recommended further carbohydrate restriction (<100grams/day).  - refill of phentermine.   - discussed goals of therapy   - repeat labs in three months?         Relevant Medications    phentermine (ADIPEX-P) 15 MG capsule    Hypercholesterolemia with hypertriglyceridemia    Relevant Medications    phentermine (ADIPEX-P) 15 MG capsule    Hyperinsulinemia    Metabolic syndrome X      Follow-up Visit   Expected date:  Mar 02, 2023 (Approximate)      Follow Up Appointment Details:     Follow-up with whom?: Me    Follow-Up for what?: Chronic Disease f/u    Chronic Disease f/u: General (Other)    Additional Details: Weight loss visit    How?: In Person    Is this an as-needed follow-up?: No                     Subjective  "  Benita is a 27 year old who presents for the following health issues   Chief Complaint   Patient presents with     Weight Loss     Follow-up        Patient presents for treatment of chronic, comorbid conditions using intensive therapeutic lifestyle interventions including nutrition, physical activity, and behavior management.   - successes: happy to lose weight.  \"Less carbs.\"  She is much more mindful of carb and protein consumption.  \"Moreso than the medication. \"  Clothes fit better.  Energy has been high.  Goal for carbs is 130 or less. She tries to get 130 grams of protein per day.  She averages 100 grams of protein.    - struggles: currently sick.  None   - exercise plan: continues to exercise at [employer].  Cardio, weight, yoga.     - tracking/journaling: tracking.   - hunger: controlled.    - medication benefits: phentermine helpful.  Anxiety stable.   Less noticeable.  side effects: struggled with topiramate (mood changes).      History of Present Illness       Reason for visit:  Weight    She eats 2-3 servings of fruits and vegetables daily.She consumes 0 sweetened beverage(s) daily.She exercises with enough effort to increase her heart rate 30 to 60 minutes per day.  She exercises with enough effort to increase her heart rate 6 days per week.   She is taking medications regularly.       Review of Systems   Constitutional: Negative.    All other systems reviewed and are negative.           Objective    Vitals - Patient Reported  Weight (Patient Reported): 90.4 kg (199 lb 5 oz)    Down 16 lbs.     Physical Exam  Nursing note reviewed.   Constitutional:       General: She is not in acute distress.     Appearance: Normal appearance. She is not ill-appearing.   HENT:      Head: Normocephalic and atraumatic.   Eyes:      Extraocular Movements: Extraocular movements intact.      Conjunctiva/sclera: Conjunctivae normal.   Pulmonary:      Effort: Pulmonary effort is normal.   Neurological:      Mental Status: " She is alert and oriented to person, place, and time.   Psychiatric:         Attention and Perception: Attention normal.         Mood and Affect: Mood normal.         Speech: Speech normal.         Thought Content: Thought content normal.             This note has been dictated using voice recognition software. Any grammatical or context distortions are unintentional and inherent to the software

## 2022-12-02 NOTE — ASSESSMENT & PLAN NOTE
27 year old year old female in clinic today to discuss treatment of the following conditions through diet and lifestyle modification and weight loss:  1. Class 1 obesity due to excess calories with serious comorbidity and body mass index (BMI) of 32.0 to 32.9 in adult    2. Hypercholesterolemia with hypertriglyceridemia    3. Hyperinsulinemia    4. Metabolic syndrome X      The patient's weight loss result since the last visit was successful based on weight loss.  She is leading with protein and exercise. I recommended further carbohydrate restriction (<100grams/day).  - refill of phentermine.   - discussed goals of therapy   - repeat labs in three months?

## 2023-01-02 ENCOUNTER — THERAPY VISIT (OUTPATIENT)
Dept: PHYSICAL THERAPY | Facility: CLINIC | Age: 28
End: 2023-01-02
Attending: OBSTETRICS & GYNECOLOGY
Payer: COMMERCIAL

## 2023-01-02 DIAGNOSIS — N39.41 URGENCY INCONTINENCE: ICD-10-CM

## 2023-01-02 DIAGNOSIS — R35.0 URINARY FREQUENCY: ICD-10-CM

## 2023-01-02 DIAGNOSIS — R10.2 PELVIC PAIN IN FEMALE: ICD-10-CM

## 2023-01-02 PROCEDURE — 97535 SELF CARE MNGMENT TRAINING: CPT | Mod: GP | Performed by: PHYSICAL THERAPIST

## 2023-01-02 PROCEDURE — 97110 THERAPEUTIC EXERCISES: CPT | Mod: GP | Performed by: PHYSICAL THERAPIST

## 2023-01-02 PROCEDURE — 97161 PT EVAL LOW COMPLEX 20 MIN: CPT | Mod: GP | Performed by: PHYSICAL THERAPIST

## 2023-01-02 NOTE — PROGRESS NOTES
Physical Therapy Initial Evaluation  Subjective:  The history is provided by the patient. No  was used.   Patient Health History  Benita Robbins being seen for Vaginal therapy.     Problem began: 2/1/2022 (11/1/22 MD order for PT).   Problem occurred: reports c/o vaginal pain with intercourse, unable to use a tampon and pain with pelvic exam for several years . She c/o urinary urgency and frequency also for several years.  She also report occasional urge urinary incontinence(1x monthly) and constipation several times weekly. She does report increased pelvic pain with intercourse in the past year.     Pain is reported as 6/10 on pain scale.  General health as reported by patient is good.  Pertinent medical history includes: overweight and other. Other medical history details: Anxiety.   Red flags:  None as reported by patient.  Medical allergies: none.   Surgeries include:  None.    Current medications:  Sleep medication and other. Other medications details: Weight loss meds.    Current occupation is .   Primary job tasks include:  Computer work, driving and prolonged sitting.                  Therapist Generated HPI Evaluation         Type of problem:  Pelvic dysfunction and other (pelvic pain, dyspareunia ).    This is a chronic condition.  Condition occurred with:  Insidious onset.  Where condition occurred: for unknown reasons.  Patient reports pain:  Vaginal.  Pain is described as burning and cramping and is intermittent.  Pain is the same all the time.  Since onset symptoms are gradually worsening.  Symptoms are exacerbated by intercourse, using a tampon and other (pelvic exam , urgency)  and relieved by nothing.      Restrictions due to condition include:  Working in normal job without restrictions.  Barriers include:  None as reported by patient.                        Objective:  System                                 Pelvic Dysfunction Evaluation:    Bladder/Pelvic  Problems:    Storage Problem:  Frequency, urgency, urge incontinence and nocturia  Emptying Problem:  Incomplete emptying        Flexibility:  normal      Abdominal Wall:  normal  Diastasis Recti:  Normal      Pelvic Clock Exam:    Ischiocavernosis pain:  -  Bulbocavernosis pain:  -  Transverse Perineal:  +  Levator ANI:  ++  Perineal Body:  +      External Assessment:    Skin Condition:  Normal    Bearing Down/Coughing:  Normal  Tissue Symmetry:  Normal  Introitus:  Normal  Muscle Contraction/Perineal Mobility:  Slight lift, no urogential triangle descent and substitution  Internal Assessment:    Sensory Exam:  Normal  Contraction/Grade:  Weak squeeze, 2 second hold (2)  Accessory Muscle use-Abdominals:  Yes         Additional History:    Number of Pregnancies: 0    Caffeine Consumption:  None                      General     ROS    Assessment/Plan:    Patient is a 27 year old female with pelvic complaints.    Patient has the following significant findings with corresponding treatment plan.                Diagnosis 1:  Pelvic pain, dyspareunia, urinary frequency  Pain -  manual therapy, self management, education and home program  Decreased strength - therapeutic exercise, therapeutic activities and home program  Impaired muscle performance - biofeedback, neuro re-education and home program  Decreased function - therapeutic activities and home program    Therapy Evaluation Codes:   1) History comprised of:   Personal factors that impact the plan of care:      None.    Comorbidity factors that impact the plan of care are:      None.     Medications impacting care: None.  2) Examination of Body Systems comprised of:   Body structures and functions that impact the plan of care:      Pelvis.   Activity limitations that impact the plan of care are:      Frequency, Pelvic Exam, [place], Urgency, Urge incontinence and Using a tampon.  3) Clinical presentation characteristics  are:   Stable/Uncomplicated.  4) Decision-Making    Low complexity using standardized patient assessment instrument and/or measureable assessment of functional outcome.  Cumulative Therapy Evaluation is: Low complexity.    Previous and current functional limitations:  (See Goal Flow Sheet for this information)    Short term and Long term goals: (See Goal Flow Sheet for this information)     Communication ability:  Patient appears to be able to clearly communicate and understand verbal and written communication and follow directions correctly.  Treatment Explanation - The following has been discussed with the patient:   RX ordered/plan of care  Anticipated outcomes  Possible risks and side effects  This patient would benefit from PT intervention to resume normal activities.   Rehab potential is good.    Frequency:  1 X week, once daily  Duration:  for 4 weeks then 2x month for 2 month  Discharge Plan:  Achieve all LTG.  Independent in home treatment program.  Reach maximal therapeutic benefit.    Please refer to the daily flowsheet for treatment today, total treatment time and time spent performing 1:1 timed codes.

## 2023-01-06 ENCOUNTER — VIRTUAL VISIT (OUTPATIENT)
Dept: SURGERY | Facility: CLINIC | Age: 28
End: 2023-01-06
Payer: COMMERCIAL

## 2023-01-06 DIAGNOSIS — E66.3 OVERWEIGHT (BMI 25.0-29.9): Primary | ICD-10-CM

## 2023-01-06 PROCEDURE — 97803 MED NUTRITION INDIV SUBSEQ: CPT | Mod: GT | Performed by: DIETITIAN, REGISTERED

## 2023-01-06 NOTE — PROGRESS NOTES
Benita Robbins is a 27 year old who is being evaluated via a billable video visit.        Medical  Weight Loss Follow-Up Diet Evaluation  Assessment:  Benita is presenting today for a follow up weight management nutrition consultation. Pt has had an initial appointment with Dr. Taylor.  Weight loss medication: Phentermine.   Pt's weight is 198 lb  Initial weight: 215 lb  Weight change: down 17 lbs    No flowsheet data found.  BMI: There is no height or weight on file to calculate BMI.  Ideal body weight: 66.2 kg (145 lb 15.1 oz)  Adjusted ideal body weight: 77.8 kg (171 lb 9.1 oz)    Estimated RMR ([place]us-St Lewor equation):  1767 kcals x 1.2 (sedentary) = 2120 kcals (for weight maintenance)  1767 kcals x 1.3 (light active) = 2429 kcals (for weight maintenance)  1767 kcals x 1.3 (mod active) = 2738 kcals (for weight maintenance)  Recommended Protein Intake: 130 grams of protein/day (35% of 2100 kcals)   Estimated energy needs for weight loss: 2100 kcals  Patient Active Problem List:  Patient Active Problem List   Diagnosis     Generalized anxiety disorder     Hypercholesterolemia with hypertriglyceridemia     Acne, unspecified acne type     Class 1 obesity due to excess calories with serious comorbidity and body mass index (BMI) of 32.0 to 32.9 in adult     Hyperinsulinemia     Metabolic syndrome X     Dyspareunia in female     Pelvic pain in female     Urinary frequency     Urgency incontinence     Personal interests: Loves food, cooking, wellness, reading about nutrition     Progress on goals from last visit: She has been using Cronometer to track her intake. She has been aiming for 100 g protein and 90 g of carbs, and 1800 kcals. She finds it can be hard to hit protein and stay under the carbs, but does overall feel good with these amounts and has lost 17 lbs.  She may adjust her carbs and fat goals slightly, but will ensure she is sticking with about 1800 kcals daily.  Nutrition  Diagnosis:    Overweight/Obesity (NC 3.3) related to excessive calorie intake as evidenced by BMI 31.79    Intervention:  1. Food and/or nutrient delivery: tracking intake to get 1800 kcal,  g protein, less then 100 g carbs  2. Nutrition education: discussed nutrition goals for fats and how to adjust if needed    Monitoring/Evaluation:    Goals:  1. Track protein and carbs (130 g protein - 25%, 180 g carbs - 35% of calories, calories at 2100 kcals)    Patient to follow up in 2 month(s) with bariatrician and 3 month(s) with RD    Video-Visit Details    Type of service:  Video Visit    Video Start Time (time video started): 1:30 pm    Video End Time (time video stopped): 1:45 pm    Originating Location (pt. Location): Home        Distant Location (provider location):  Off-site    Mode of Communication:  Video Conference via [facility]    Physician has received verbal consent for a Video Visit from the patient? Yes      Zayra Hernandez

## 2023-01-06 NOTE — LETTER
1/6/2023         RE: Benita Robbins  [address]  [place] [address]        Dear Colleague,    Thank you for referring your patient, Benita Robbins, to the [facility] [place]. Please see a copy of my visit note below.    Benita Robbins is a 27 year old who is being evaluated via a billable video visit.        Medical  Weight Loss Follow-Up Diet Evaluation  Assessment:  Benita is presenting today for a follow up weight management nutrition consultation. Pt has had an initial appointment with Dr. Taylor.  Weight loss medication: Phentermine.   Pt's weight is 198 lb  Initial weight: 215 lb  Weight change: down 17 lbs    No flowsheet data found.  BMI: There is no height or weight on file to calculate BMI.  Ideal body weight: 66.2 kg (145 lb 15.1 oz)  Adjusted ideal body weight: 77.8 kg (171 lb 9.1 oz)    Estimated RMR ([place]-St Lewor equation):  1767 kcals x 1.2 (sedentary) = 2120 kcals (for weight maintenance)  1767 kcals x 1.3 (light active) = 2429 kcals (for weight maintenance)  1767 kcals x 1.3 (mod active) = 2738 kcals (for weight maintenance)  Recommended Protein Intake: 130 grams of protein/day (35% of 2100 kcals)   Estimated energy needs for weight loss: 2100 kcals  Patient Active Problem List:  Patient Active Problem List   Diagnosis     Generalized anxiety disorder     Hypercholesterolemia with hypertriglyceridemia     Acne, unspecified acne type     Class 1 obesity due to excess calories with serious comorbidity and body mass index (BMI) of 32.0 to 32.9 in adult     Hyperinsulinemia     Metabolic syndrome X     Dyspareunia in female     Pelvic pain in female     Urinary frequency     Urgency incontinence     Personal interests: Loves food, cooking, wellness, reading about nutrition     Progress on goals from last visit: She has been using Cronometer to track her intake. She has been aiming for 100 g protein and 90 g of carbs, and  1800 kcals. She finds it can be hard to hit protein and stay under the carbs, but does overall feel good with these amounts and has lost 17 lbs.  She may adjust her carbs and fat goals slightly, but will ensure she is sticking with about 1800 kcals daily.  Nutrition Diagnosis:    Overweight/Obesity (NC 3.3) related to excessive calorie intake as evidenced by BMI 31.79    Intervention:  1. Food and/or nutrient delivery: tracking intake to get 1800 kcal,  g protein, less then 100 g carbs  2. Nutrition education: discussed nutrition goals for fats and how to adjust if needed    Monitoring/Evaluation:    Goals:  1. Track protein and carbs (130 g protein - 25%, 180 g carbs - 35% of calories, calories at 2100 kcals)    Patient to follow up in 2 month(s) with bariatrician and 3 month(s) with RD    Video-Visit Details    Type of service:  Video Visit    Video Start Time (time video started): 1:30 pm    Video End Time (time video stopped): 1:45 pm    Originating Location (pt. Location): Home        Distant Location (provider location):  Off-site    Mode of Communication:  Video Conference via [facility]    Physician has received verbal consent for a Video Visit from the patient? Yes      Zayra Hernandez          Again, thank you for allowing me to participate in the care of your patient.        Sincerely,        Zayra Hernandez

## 2023-01-09 ENCOUNTER — THERAPY VISIT (OUTPATIENT)
Dept: PHYSICAL THERAPY | Facility: CLINIC | Age: 28
End: 2023-01-09
Payer: COMMERCIAL

## 2023-01-09 DIAGNOSIS — R10.2 PELVIC PAIN IN FEMALE: Primary | ICD-10-CM

## 2023-01-09 DIAGNOSIS — R35.0 URINARY FREQUENCY: ICD-10-CM

## 2023-01-09 DIAGNOSIS — N39.41 URGENCY INCONTINENCE: ICD-10-CM

## 2023-01-09 PROCEDURE — 97140 MANUAL THERAPY 1/> REGIONS: CPT | Mod: GP | Performed by: PHYSICAL THERAPIST

## 2023-01-09 PROCEDURE — 97110 THERAPEUTIC EXERCISES: CPT | Mod: GP | Performed by: PHYSICAL THERAPIST

## 2023-01-09 PROCEDURE — 97530 THERAPEUTIC ACTIVITIES: CPT | Mod: GP | Performed by: PHYSICAL THERAPIST

## 2023-01-22 DIAGNOSIS — F41.0 PANIC ATTACK: ICD-10-CM

## 2023-01-22 DIAGNOSIS — F41.1 GENERALIZED ANXIETY DISORDER: ICD-10-CM

## 2023-01-24 RX ORDER — ONDANSETRON 4 MG/1
TABLET, ORALLY DISINTEGRATING ORAL
Qty: 15 TABLET | Refills: 0 | Status: SHIPPED | OUTPATIENT
Start: 2023-01-24 | End: 2023-05-05

## 2023-01-24 RX ORDER — LORAZEPAM 0.5 MG/1
TABLET ORAL
Qty: 15 TABLET | Refills: 0 | Status: SHIPPED | OUTPATIENT
Start: 2023-01-24 | End: 2023-05-30

## 2023-02-20 ENCOUNTER — VIRTUAL VISIT (OUTPATIENT)
Dept: FAMILY MEDICINE | Facility: CLINIC | Age: 28
End: 2023-02-20
Payer: COMMERCIAL

## 2023-02-20 ENCOUNTER — TELEPHONE (OUTPATIENT)
Dept: FAMILY MEDICINE | Facility: CLINIC | Age: 28
End: 2023-02-20

## 2023-02-20 DIAGNOSIS — E88.810 METABOLIC SYNDROME X: ICD-10-CM

## 2023-02-20 DIAGNOSIS — E78.2 HYPERCHOLESTEROLEMIA WITH HYPERTRIGLYCERIDEMIA: ICD-10-CM

## 2023-02-20 DIAGNOSIS — E66.09 CLASS 1 OBESITY DUE TO EXCESS CALORIES WITH SERIOUS COMORBIDITY AND BODY MASS INDEX (BMI) OF 32.0 TO 32.9 IN ADULT: Primary | ICD-10-CM

## 2023-02-20 DIAGNOSIS — E66.811 CLASS 1 OBESITY DUE TO EXCESS CALORIES WITH SERIOUS COMORBIDITY AND BODY MASS INDEX (BMI) OF 32.0 TO 32.9 IN ADULT: Primary | ICD-10-CM

## 2023-02-20 PROCEDURE — 99213 OFFICE O/P EST LOW 20 MIN: CPT | Mod: VID | Performed by: FAMILY MEDICINE

## 2023-02-20 RX ORDER — PHENTERMINE HYDROCHLORIDE 30 MG/1
CAPSULE ORAL
Qty: 30 CAPSULE | Refills: 0 | Status: SHIPPED | OUTPATIENT
Start: 2023-02-20 | End: 2023-04-11

## 2023-02-20 ASSESSMENT — ENCOUNTER SYMPTOMS: CONSTITUTIONAL NEGATIVE: 1

## 2023-02-20 NOTE — ASSESSMENT & PLAN NOTE
"27 year old year old female in clinic today to discuss treatment of the following conditions through diet and lifestyle modification and weight loss:  1. Class 1 obesity due to excess calories with serious comorbidity and body mass index (BMI) of 32.0 to 32.9 in adult    2. Hypercholesterolemia with hypertriglyceridemia    3. Metabolic syndrome X      The patient's weight loss result since the last visit was successful based on weight loss. She is down approximately 10% over the past 6 months since she started 15 mg capsules of phentermine.  She did struggle during the holidays and during the month of January but states that she is doing better nutritionally.  Throughout, she has exercised on a regular basis.  I believe that her weight loss is sustainable.  We will implement a \"burst\" of phentermine for the next 30-60 days at 30 mg capsules with a plan to restore her previous dose of 15 mg.  We discussed that higher doses of phentermine are associated with weight regain.  She will continue exercise.  The next visit could be done face-to-face with fasting lab work per patient's preference.  "

## 2023-02-20 NOTE — PROGRESS NOTES
"Benita is a 27 year old who is being evaluated via a billable video visit.      How would you like to obtain your AVS? MyChart  If the video visit is dropped, the invitation should be resent by: Text to cell phone: [phone]  Will anyone else be joining your video visit? No    Video-Visit Details    Type of service:  Video Visit     Originating Location (pt. Location): Home    Distant Location (provider location):  On-site  Platform used for Video Visit: XIHA    Problem List Items Addressed This Visit     Class 1 obesity due to excess calories with serious comorbidity and body mass index (BMI) of 32.0 to 32.9 in adult - Primary     27 year old year old female in clinic today to discuss treatment of the following conditions through diet and lifestyle modification and weight loss:  1. Class 1 obesity due to excess calories with serious comorbidity and body mass index (BMI) of 32.0 to 32.9 in adult    2. Hypercholesterolemia with hypertriglyceridemia    3. Metabolic syndrome X      The patient's weight loss result since the last visit was successful based on weight loss. She is down approximately 10% over the past 6 months since she started 15 mg capsules of phentermine.  She did struggle during the holidays and during the month of January but states that she is doing better nutritionally.  Throughout, she has exercised on a regular basis.  I believe that her weight loss is sustainable.  We will implement a \"burst\" of phentermine for the next 30-60 days at 30 mg capsules with a plan to restore her previous dose of 15 mg.  We discussed that higher doses of phentermine are associated with weight regain.  She will continue exercise.  The next visit could be done face-to-face with fasting lab work per patient's preference.         Relevant Medications    phentermine (ADIPEX-P) 30 MG capsule    Hypercholesterolemia with hypertriglyceridemia    Relevant Medications    phentermine (ADIPEX-P) 30 MG capsule    Metabolic " "syndrome X          Subjective   Benita is a 27 year old who presents for the following health issues   Chief Complaint   Patient presents with     Weight Loss     Follow-up        Patient presents for treatment of chronic, comorbid conditions using intensive therapeutic lifestyle interventions including nutrition, physical activity, and behavior management.   - successes: \"back on track for eating.\"  She struggling in January after holidays and with COVID   - struggles: \"I have hit a stall.\" She is still losing weight but not as consistently.    - exercise plan: doing well.      - tracking/journaling: ad paul.  Back to eating more protein.   - hunger: controlled.   - medication benefits: decreasing benefits. side effects: none.      History of Present Illness       Reason for visit:  Weight loss    She eats 4 or more servings of fruits and vegetables daily.She consumes 0 sweetened beverage(s) daily.She exercises with enough effort to increase her heart rate 30 to 60 minutes per day.  She exercises with enough effort to increase her heart rate 6 days per week.   She is taking medications regularly.       Review of Systems   Constitutional: Negative.    All other systems reviewed and are negative.           Objective    Vitals - Patient Reported  Weight (Patient Reported): 87.5 kg (193 lb)  Height (Patient Reported): 175.3 cm (5' 9\")  BMI (Based on Pt Reported Ht/Wt): 28.5        Physical Exam  Nursing note reviewed.   Constitutional:       General: She is not in acute distress.     Appearance: Normal appearance. She is not ill-appearing.   HENT:      Head: Normocephalic and atraumatic.   Eyes:      Extraocular Movements: Extraocular movements intact.      Conjunctiva/sclera: Conjunctivae normal.   Pulmonary:      Effort: Pulmonary effort is normal.   Neurological:      Mental Status: She is alert and oriented to person, place, and time.   Psychiatric:         Attention and Perception: Attention normal.         Mood " and Affect: Mood normal.         Speech: Speech normal.         Thought Content: Thought content normal.                  This note has been dictated using voice recognition software. Any grammatical or context distortions are unintentional and inherent to the software

## 2023-02-23 NOTE — TELEPHONE ENCOUNTER
Prior Authorization Approval    Authorization Effective Date: 1/24/2023  Authorization Expiration Date: 5/24/2023  Medication: phentermine (ADIPEX-P) 30 MG capsule  Approved Dose/Quantity:   Reference #:     Insurance Company: [employer] - Phone [phone] Fax [phone]  Which Pharmacy is filling the prescription (Not needed for infusion/clinic administered): [facility] #[id number] - [place], [place] - [address] [place] [address] [address] AT [facility]  Pharmacy Notified: Yes  Patient Notified: Yes

## 2023-02-23 NOTE — TELEPHONE ENCOUNTER
PA Initiation    Medication: phentermine (ADIPEX-P) 30 MG capsule  Insurance Company: [employer] - Phone [phone] Fax [phone]  Pharmacy Filling the Rx: [facility] #[id number] - [address] AT [facility] & [place]  Filling Pharmacy Phone: [phone]  Filling Pharmacy Fax: [phone]  Start Date: 2/23/2023    Filled out form and faxed back to insurance.

## 2023-04-08 DIAGNOSIS — E66.811 CLASS 1 OBESITY DUE TO EXCESS CALORIES WITH SERIOUS COMORBIDITY AND BODY MASS INDEX (BMI) OF 32.0 TO 32.9 IN ADULT: ICD-10-CM

## 2023-04-08 DIAGNOSIS — E66.09 CLASS 1 OBESITY DUE TO EXCESS CALORIES WITH SERIOUS COMORBIDITY AND BODY MASS INDEX (BMI) OF 32.0 TO 32.9 IN ADULT: ICD-10-CM

## 2023-04-08 DIAGNOSIS — E78.2 HYPERCHOLESTEROLEMIA WITH HYPERTRIGLYCERIDEMIA: ICD-10-CM

## 2023-04-10 ENCOUNTER — THERAPY VISIT (OUTPATIENT)
Dept: PHYSICAL THERAPY | Facility: CLINIC | Age: 28
End: 2023-04-10
Payer: COMMERCIAL

## 2023-04-10 DIAGNOSIS — N39.41 URGENCY INCONTINENCE: ICD-10-CM

## 2023-04-10 DIAGNOSIS — R35.0 URINARY FREQUENCY: ICD-10-CM

## 2023-04-10 DIAGNOSIS — R10.2 PELVIC PAIN IN FEMALE: Primary | ICD-10-CM

## 2023-04-10 PROCEDURE — 97530 THERAPEUTIC ACTIVITIES: CPT | Mod: GP | Performed by: PHYSICAL THERAPIST

## 2023-04-10 PROCEDURE — 97110 THERAPEUTIC EXERCISES: CPT | Mod: GP | Performed by: PHYSICAL THERAPIST

## 2023-04-10 PROCEDURE — 97140 MANUAL THERAPY 1/> REGIONS: CPT | Mod: GP | Performed by: PHYSICAL THERAPIST

## 2023-04-10 NOTE — PROGRESS NOTES
"Subjective:  HPI  Physical Exam                    Objective:  System    Physical Exam    General     ROS    Assessment/Plan:    PROGRESS  REPORT    Progress reporting period is from 1/2/23 to 4/10/23.       SUBJECTIVE  Subjective changes noted by patient:  yes.  Subjective: Benita reports doing the pelvic floor exercises go well- doing them 3-4x daily and has done the PF/vaginal stretching with her finger a few times in past 2 months. She had COVID and was on vacation in past 2 months so just now returning to PT. She has not had any urine leakage related to urgency however still has frequency issues- voiding every 1-1.5 hrs and 2x night. She continues to have pain with intercourse- slightly better.    Current pain level is NA  .     Previous pain level was  NA  .   Changes in function:  Yes (See Goal flowsheet attached for changes in current functional level)  Adverse reaction to treatment or activity: None    OBJECTIVE  Changes noted in objective findings:  Yes,   Objective: Increased PFM strength and awareness: MMT 3-/5, increased endurance to 5\" hold- improved relaxation phase. point tenderness, pelvic floor muscle tension palpated- specifically at 6 pelvic clock position. Pt advised to purchase dilators to use for PF stretching. Instruct in PF/plie squat and review of using a stool under feet when toileting and increasing fiber/ metamucil to prevent constipation. Discussed doing quick flicks as a urge suppression technique when needed.     ASSESSMENT/PLAN  Updated problem list and treatment plan: Diagnosis 1:  Pelvic pain/dyspareunia/urinary frequency  Pain -  manual therapy, self management, education and home program  Decreased strength - therapeutic exercise, therapeutic activities and home program  Impaired muscle performance - neuro re-education and home program  Decreased function - therapeutic activities and home program  STG/LTGs have been met or progress has been made towards goals:  Yes (See Goal " flow sheet completed today.)  Assessment of Progress: The patient's condition is improving.  Patient is meeting short term goals and is progressing towards long term goals.  Self Management Plans:  Patient has been instructed in a home treatment program.  Patient  has been instructed in self management of symptoms.  I have re-evaluated this patient and find that the nature, scope, duration and intensity of the therapy is appropriate for the medical condition of the patient.  Benita continues to require the following intervention to meet STG and LTG's:  PT    Recommendations:  This patient would benefit from continued therapy.     Frequency:  1 X a month, once daily  Duration:  for 3 months        Please refer to the daily flowsheet for treatment today, total treatment time and time spent performing 1:1 timed codes.

## 2023-04-11 ENCOUNTER — MYC REFILL (OUTPATIENT)
Dept: FAMILY MEDICINE | Facility: CLINIC | Age: 28
End: 2023-04-11
Payer: COMMERCIAL

## 2023-04-11 DIAGNOSIS — E66.09 CLASS 1 OBESITY DUE TO EXCESS CALORIES WITH SERIOUS COMORBIDITY AND BODY MASS INDEX (BMI) OF 32.0 TO 32.9 IN ADULT: ICD-10-CM

## 2023-04-11 DIAGNOSIS — E78.2 HYPERCHOLESTEROLEMIA WITH HYPERTRIGLYCERIDEMIA: ICD-10-CM

## 2023-04-11 DIAGNOSIS — E66.811 CLASS 1 OBESITY DUE TO EXCESS CALORIES WITH SERIOUS COMORBIDITY AND BODY MASS INDEX (BMI) OF 32.0 TO 32.9 IN ADULT: ICD-10-CM

## 2023-04-11 RX ORDER — PHENTERMINE HYDROCHLORIDE 30 MG/1
CAPSULE ORAL
Qty: 30 CAPSULE | Refills: 0 | Status: SHIPPED | OUTPATIENT
Start: 2023-04-11 | End: 2023-10-06

## 2023-04-12 RX ORDER — PHENTERMINE HYDROCHLORIDE 30 MG/1
CAPSULE ORAL
Qty: 30 CAPSULE | Refills: 0 | OUTPATIENT
Start: 2023-04-12

## 2023-04-12 NOTE — TELEPHONE ENCOUNTER
Routing refill request to provider for review/approval because:  Drug not on the [facility] refill protocol   duplicate    Last Written Prescription Date:  4/11/2023  Last Fill Quantity: 30,  # refills: 0   Last office visit provider:  2/20/2023     Requested Prescriptions   Pending Prescriptions Disp Refills     phentermine (ADIPEX-P) 30 MG capsule 30 capsule 0     Sig: TAKE 1 CAPSULE(15 MG) BY MOUTH EVERY MORNING       There is no refill protocol information for this order          Zhane Hook RN 04/12/23 7:56 AM

## 2023-04-27 ENCOUNTER — MYC MEDICAL ADVICE (OUTPATIENT)
Dept: FAMILY MEDICINE | Facility: CLINIC | Age: 28
End: 2023-04-27
Payer: COMMERCIAL

## 2023-05-05 ENCOUNTER — OFFICE VISIT (OUTPATIENT)
Dept: FAMILY MEDICINE | Facility: CLINIC | Age: 28
End: 2023-05-05
Attending: FAMILY MEDICINE
Payer: COMMERCIAL

## 2023-05-05 VITALS
TEMPERATURE: 98.1 F | OXYGEN SATURATION: 100 % | WEIGHT: 188.13 LBS | HEART RATE: 74 BPM | BODY MASS INDEX: 27.86 KG/M2 | SYSTOLIC BLOOD PRESSURE: 129 MMHG | DIASTOLIC BLOOD PRESSURE: 78 MMHG | RESPIRATION RATE: 16 BRPM | HEIGHT: 69 IN

## 2023-05-05 DIAGNOSIS — E78.2 HYPERCHOLESTEROLEMIA WITH HYPERTRIGLYCERIDEMIA: ICD-10-CM

## 2023-05-05 DIAGNOSIS — Z82.49 FAMILY HISTORY OF ISCHEMIC HEART DISEASE: ICD-10-CM

## 2023-05-05 DIAGNOSIS — E16.1 HYPERINSULINEMIA: ICD-10-CM

## 2023-05-05 DIAGNOSIS — E88.810 METABOLIC SYNDROME X: ICD-10-CM

## 2023-05-05 LAB
ALT SERPL W P-5'-P-CCNC: 6 U/L (ref 10–35)
ANION GAP SERPL CALCULATED.3IONS-SCNC: 13 MMOL/L (ref 7–15)
APO A-I SERPL-MCNC: 63 MG/DL
BUN SERPL-MCNC: 12.5 MG/DL (ref 6–20)
CALCIUM SERPL-MCNC: 9.5 MG/DL (ref 8.6–10)
CHLORIDE SERPL-SCNC: 103 MMOL/L (ref 98–107)
CHOLEST SERPL-MCNC: 203 MG/DL
CREAT SERPL-MCNC: 0.8 MG/DL (ref 0.51–0.95)
DEPRECATED HCO3 PLAS-SCNC: 20 MMOL/L (ref 22–29)
GFR SERPL CREATININE-BSD FRML MDRD: >90 ML/MIN/1.73M2
GLUCOSE SERPL-MCNC: 91 MG/DL (ref 70–99)
HDLC SERPL-MCNC: 61 MG/DL
INSULIN SERPL-ACNC: 8.3 UU/ML (ref 2.6–24.9)
LDLC SERPL CALC-MCNC: 120 MG/DL
NONHDLC SERPL-MCNC: 142 MG/DL
POTASSIUM SERPL-SCNC: 4.3 MMOL/L (ref 3.4–5.3)
SODIUM SERPL-SCNC: 136 MMOL/L (ref 136–145)
TRIGL SERPL-MCNC: 110 MG/DL

## 2023-05-05 PROCEDURE — 99000 SPECIMEN HANDLING OFFICE-LAB: CPT | Performed by: FAMILY MEDICINE

## 2023-05-05 PROCEDURE — 99214 OFFICE O/P EST MOD 30 MIN: CPT | Performed by: FAMILY MEDICINE

## 2023-05-05 PROCEDURE — 84460 ALANINE AMINO (ALT) (SGPT): CPT | Performed by: FAMILY MEDICINE

## 2023-05-05 PROCEDURE — 82172 ASSAY OF APOLIPOPROTEIN: CPT | Mod: 90 | Performed by: FAMILY MEDICINE

## 2023-05-05 PROCEDURE — 83695 ASSAY OF LIPOPROTEIN(A): CPT | Performed by: FAMILY MEDICINE

## 2023-05-05 PROCEDURE — 80061 LIPID PANEL: CPT | Performed by: FAMILY MEDICINE

## 2023-05-05 PROCEDURE — 36415 COLL VENOUS BLD VENIPUNCTURE: CPT | Performed by: FAMILY MEDICINE

## 2023-05-05 PROCEDURE — 80048 BASIC METABOLIC PNL TOTAL CA: CPT | Performed by: FAMILY MEDICINE

## 2023-05-05 PROCEDURE — 83525 ASSAY OF INSULIN: CPT | Performed by: FAMILY MEDICINE

## 2023-05-05 RX ORDER — PHENTERMINE HYDROCHLORIDE 15 MG/1
15 CAPSULE ORAL EVERY MORNING
Qty: 90 CAPSULE | Refills: 0 | Status: SHIPPED | OUTPATIENT
Start: 2023-05-05 | End: 2023-10-06

## 2023-05-05 ASSESSMENT — ENCOUNTER SYMPTOMS: CONSTITUTIONAL NEGATIVE: 1

## 2023-05-05 NOTE — ASSESSMENT & PLAN NOTE
The patient and I reviewed heart disease risk in the context of metabolic health.  Given family history of heart disease (mother and uncle have elevated LP(a)) as well as her personal history of hyperinsulinemia, I think she does have significant heart disease risk.  She has a physical with her primary care provider next week.  Fasting labs will be obtained today.  We will check lipid profile, LP(a), apolipoprotein B.  She may be an individual who should consider early mitigation strategies such as low-dose statin therapy.

## 2023-05-05 NOTE — PROGRESS NOTES
Assessment & Plan   Problem List Items Addressed This Visit     BMI 27.0-27.9,adult - Primary     28 year old year old female in clinic today to discuss treatment of the following conditions through diet and lifestyle modification and weight loss:  1. BMI 27.0-27.9,adult    2. Hypercholesterolemia with hypertriglyceridemia    3. Hyperinsulinemia    4. Metabolic syndrome X    5. Family history of ischemic heart disease      The patient's weight loss result since the last visit was successful based on ongoing weight loss.  She is down approximately 12.5%.  At this point, she has a history of obesity.  She is at the lowest weight she has been as an adult.  She is tells me that she feels great.  We had a lengthy discussion regarding health versus scale measurements and what her goal might be.  I anticipate given her current strategies that she will continue to lose weight slowly.  I doubt that phentermine is doing anything for her at this point we discussed process of tapering discontinuance.  15 mg capsule sent to pharmacy to help facilitate.  Follow-up with me as needed going forward.         Relevant Medications    phentermine (ADIPEX-P) 15 MG capsule    Hypercholesterolemia with hypertriglyceridemia     The patient and I reviewed heart disease risk in the context of metabolic health.  Given family history of heart disease (mother and uncle have elevated LP(a)) as well as her personal history of hyperinsulinemia, I think she does have significant heart disease risk.  She has a physical with her primary care provider next week.  Fasting labs will be obtained today.  We will check lipid profile, LP(a), apolipoprotein B.  She may be an individual who should consider early mitigation strategies such as low-dose statin therapy.         Relevant Medications    phentermine (ADIPEX-P) 15 MG capsule    Other Relevant Orders    Lipid panel reflex to direct LDL Fasting    Lipoprotein (a)    Apolipoprotein B     "Hyperinsulinemia    Relevant Medications    phentermine (ADIPEX-P) 15 MG capsule    Other Relevant Orders    Lipid panel reflex to direct LDL Fasting    Insulin level    ALT    Metabolic syndrome X    Relevant Medications    phentermine (ADIPEX-P) 15 MG capsule    Other Relevant Orders    Basic metabolic panel  (Ca, Cl, CO2, Creat, Gluc, K, Na, BUN)    Lipid panel reflex to direct LDL Fasting    Insulin level    ALT   Other Visit Diagnoses     Family history of ischemic heart disease        Relevant Orders    Lipoprotein (a)           BMI:   Estimated body mass index is 27.78 kg/m  as calculated from the following:    Height as of this encounter: 1.753 m (5' 9\").    Weight as of this encounter: 85.3 kg (188 lb 2 oz).   Weight management plan: Discussed healthy diet and exercise guidelines    Anil Rao MD  [facility]    Maria Ines Joy is a 28 year old, presenting for the following health issues:  Weight Loss (Follow-up/)        5/5/2023     9:02 AM   Additional Questions   Roomed by SAC   Accompanied by self         5/5/2023     9:02 AM   Patient Reported Additional Medications   Patient reports taking the following new medications no     History of Present Illness       Reason for visit:  Weight loss    She eats 4 or more servings of fruits and vegetables daily.She consumes 0 sweetened beverage(s) daily.She exercises with enough effort to increase her heart rate 30 to 60 minutes per day.  She exercises with enough effort to increase her heart rate 6 days per week.   She is taking medications regularly.      Review of Systems   Constitutional: Negative.    All other systems reviewed and are negative.           Objective    /78 (BP Location: Left arm, Patient Position: Sitting, Cuff Size: Adult Regular)   Pulse 74   Temp 98.1  F (36.7  C) (Oral)   Resp 16   Ht 1.753 m (5' 9\")   Wt 85.3 kg (188 lb 2 oz)   LMP 04/04/2023 (Approximate)   SpO2 100%   BMI 27.78 kg/m  "   Body mass index is 27.78 kg/m .  Physical Exam  Nursing note reviewed.   Constitutional:       General: She is not in acute distress.     Appearance: Normal appearance. She is not ill-appearing.   HENT:      Head: Normocephalic and atraumatic.   Eyes:      Extraocular Movements: Extraocular movements intact.      Conjunctiva/sclera: Conjunctivae normal.   Pulmonary:      Effort: Pulmonary effort is normal.   Neurological:      Mental Status: She is alert and oriented to person, place, and time.   Psychiatric:         Attention and Perception: Attention normal.         Mood and Affect: Mood normal.         Speech: Speech normal.         Thought Content: Thought content normal.

## 2023-05-05 NOTE — ASSESSMENT & PLAN NOTE
28 year old year old female in clinic today to discuss treatment of the following conditions through diet and lifestyle modification and weight loss:  1. BMI 27.0-27.9,adult    2. Hypercholesterolemia with hypertriglyceridemia    3. Hyperinsulinemia    4. Metabolic syndrome X    5. Family history of ischemic heart disease      The patient's weight loss result since the last visit was successful based on ongoing weight loss.  She is down approximately 12.5%.  At this point, she has a history of obesity.  She is at the lowest weight she has been as an adult.  She is tells me that she feels great.  We had a lengthy discussion regarding health versus scale measurements and what her goal might be.  I anticipate given her current strategies that she will continue to lose weight slowly.  I doubt that phentermine is doing anything for her at this point we discussed process of tapering discontinuance.  15 mg capsule sent to pharmacy to help facilitate.  Follow-up with me as needed going forward.

## 2023-05-05 NOTE — PATIENT INSTRUCTIONS
100+ grams of protein per day  800 grams of veggies/fruit day    Outlive - Michael Pinon   normal affect/normal behavior

## 2023-05-06 LAB — APO B100 SERPL-MCNC: 103 MG/DL

## 2023-05-09 ENCOUNTER — OFFICE VISIT (OUTPATIENT)
Dept: FAMILY MEDICINE | Facility: CLINIC | Age: 28
End: 2023-05-09
Payer: COMMERCIAL

## 2023-05-09 VITALS
WEIGHT: 189.1 LBS | HEIGHT: 68 IN | RESPIRATION RATE: 16 BRPM | BODY MASS INDEX: 28.66 KG/M2 | SYSTOLIC BLOOD PRESSURE: 122 MMHG | HEART RATE: 94 BPM | OXYGEN SATURATION: 98 % | DIASTOLIC BLOOD PRESSURE: 71 MMHG | TEMPERATURE: 98.1 F

## 2023-05-09 DIAGNOSIS — R10.2 PELVIC PAIN IN FEMALE: ICD-10-CM

## 2023-05-09 DIAGNOSIS — N91.2 AMENORRHEA: Primary | ICD-10-CM

## 2023-05-09 LAB
ALBUMIN UR-MCNC: NEGATIVE MG/DL
AMORPH CRY #/AREA URNS HPF: ABNORMAL /HPF
APPEARANCE UR: CLEAR
BACTERIA #/AREA URNS HPF: ABNORMAL /HPF
BILIRUB UR QL STRIP: NEGATIVE
CLUE CELLS: ABNORMAL
COLOR UR AUTO: YELLOW
GLUCOSE UR STRIP-MCNC: NEGATIVE MG/DL
HGB UR QL STRIP: NEGATIVE
KETONES UR STRIP-MCNC: NEGATIVE MG/DL
LEUKOCYTE ESTERASE UR QL STRIP: ABNORMAL
NITRATE UR QL: NEGATIVE
PH UR STRIP: 7 [PH] (ref 5–7)
RBC #/AREA URNS AUTO: ABNORMAL /HPF
SP GR UR STRIP: 1.02 (ref 1–1.03)
SQUAMOUS #/AREA URNS AUTO: ABNORMAL /LPF
TRICHOMONAS, WET PREP: ABNORMAL
UROBILINOGEN UR STRIP-ACNC: 0.2 E.U./DL
WBC #/AREA URNS AUTO: ABNORMAL /HPF
WBC'S/HIGH POWER FIELD, WET PREP: ABNORMAL
YEAST, WET PREP: ABNORMAL

## 2023-05-09 PROCEDURE — 81001 URINALYSIS AUTO W/SCOPE: CPT | Performed by: FAMILY MEDICINE

## 2023-05-09 PROCEDURE — 87210 SMEAR WET MOUNT SALINE/INK: CPT | Performed by: FAMILY MEDICINE

## 2023-05-09 PROCEDURE — 84702 CHORIONIC GONADOTROPIN TEST: CPT | Performed by: FAMILY MEDICINE

## 2023-05-09 PROCEDURE — 36415 COLL VENOUS BLD VENIPUNCTURE: CPT | Performed by: FAMILY MEDICINE

## 2023-05-09 PROCEDURE — 99213 OFFICE O/P EST LOW 20 MIN: CPT | Performed by: FAMILY MEDICINE

## 2023-05-09 ASSESSMENT — ANXIETY QUESTIONNAIRES
1. FEELING NERVOUS, ANXIOUS, OR ON EDGE: NEARLY EVERY DAY
7. FEELING AFRAID AS IF SOMETHING AWFUL MIGHT HAPPEN: NEARLY EVERY DAY
6. BECOMING EASILY ANNOYED OR IRRITABLE: NEARLY EVERY DAY
4. TROUBLE RELAXING: NEARLY EVERY DAY
GAD7 TOTAL SCORE: 20
IF YOU CHECKED OFF ANY PROBLEMS ON THIS QUESTIONNAIRE, HOW DIFFICULT HAVE THESE PROBLEMS MADE IT FOR YOU TO DO YOUR WORK, TAKE CARE OF THINGS AT HOME, OR GET ALONG WITH OTHER PEOPLE: VERY DIFFICULT
8. IF YOU CHECKED OFF ANY PROBLEMS, HOW DIFFICULT HAVE THESE MADE IT FOR YOU TO DO YOUR WORK, TAKE CARE OF THINGS AT HOME, OR GET ALONG WITH OTHER PEOPLE?: VERY DIFFICULT
2. NOT BEING ABLE TO STOP OR CONTROL WORRYING: NEARLY EVERY DAY
7. FEELING AFRAID AS IF SOMETHING AWFUL MIGHT HAPPEN: NEARLY EVERY DAY
3. WORRYING TOO MUCH ABOUT DIFFERENT THINGS: NEARLY EVERY DAY
GAD7 TOTAL SCORE: 20
5. BEING SO RESTLESS THAT IT IS HARD TO SIT STILL: MORE THAN HALF THE DAYS
GAD7 TOTAL SCORE: 20

## 2023-05-09 ASSESSMENT — PATIENT HEALTH QUESTIONNAIRE - PHQ9
SUM OF ALL RESPONSES TO PHQ QUESTIONS 1-9: 10
SUM OF ALL RESPONSES TO PHQ QUESTIONS 1-9: 10
10. IF YOU CHECKED OFF ANY PROBLEMS, HOW DIFFICULT HAVE THESE PROBLEMS MADE IT FOR YOU TO DO YOUR WORK, TAKE CARE OF THINGS AT HOME, OR GET ALONG WITH OTHER PEOPLE: SOMEWHAT DIFFICULT

## 2023-05-09 ASSESSMENT — PAIN SCALES - GENERAL: PAINLEVEL: SEVERE PAIN (6)

## 2023-05-09 NOTE — PROGRESS NOTES
Assessment & Plan     Amenorrhea  Amenorrhea with left lower pelvic pain  Checking HCG quant - r/o ectopic pregnancy  Also checking UA to look for RBC and possible stone or infection  Will check pelvic ultrasound  Pt will call or RTC if symptoms worsen or do not improve.   - hCG Quantitative Pregnancy; Future  - UA with Microscopic reflex to Culture - lab collect; Future  - US Pelvic Complete with Transvaginal; Future  - hCG Quantitative Pregnancy  - UA with Microscopic reflex to Culture - lab collect  - UA Microscopic with Reflex to Culture    Pelvic pain in female   as above   - hCG Quantitative Pregnancy; Future  - UA with Microscopic reflex to Culture - lab collect; Future  - US Pelvic Complete with Transvaginal; Future  - Wet prep - Clinic Collect  - hCG Quantitative Pregnancy  - UA with Microscopic reflex to Culture - lab collect  - UA Microscopic with Reflex to Culture             Depression Screening Follow Up        5/9/2023     2:12 PM   PHQ   PHQ-9 Total Score 10   Q9: Thoughts of better off dead/self-harm past 2 weeks Not at all         Follow Up Actions Taken           Gin Anne DO  [facility]    Maria Ines Joy is a 28 year old, presenting for the following health issues:  Menstrual Problem        5/9/2023     2:13 PM   Additional Questions   Roomed by Stacey     HPI   Answers for HPI/ROS submitted by the patient on 5/9/2023  If you checked off any problems, how difficult have these problems made it for you to do your work, take care of things at home, or get along with other people?: Somewhat difficult  PHQ9 TOTAL SCORE: 10  MOUNA 7 TOTAL SCORE: 20    .abd  Menstrual Concern  Onset/Duration: 2 weeks ago   Description:   Duration of bleeding episodes: 4 days  Frequency of periods: (1st day of one to 1st day of next):  every 28 days  Describe bleeding/flow:   Clots: No  Number of pads/day: 2-3        Cramping: mild  Accompanying Signs & Symptoms:  Lightheadedness:  "No  Temperature intolerance: No  Nosebleeds/Easy bruising: YES  Vaginal Discharge: No  Acne: No  Change in body hair: No  Lower left abdominal, near pelvic region ; Radiates to the back     History:  Patient's last menstrual period was 03/27/2023 (approximate).  Previous normal periods: YES  Contraceptive use: oral contraceptive  Sexually active: YES  Any bleeding after intercourse: No  Abnormal PAP Smears: No  Precipitating or alleviating factors: None  Therapies tried and outcome: None    Left lower side with some pain  Skipped menses - due end of April -   Took home UPT 10 days   Some cramping up side          Review of Systems   Constitutional, HEENT, cardiovascular, pulmonary, gi and gu systems are negative, except as otherwise noted.      Objective    /71   Pulse 94   Temp 98.1  F (36.7  C) (Temporal)   Resp 16   Ht 1.735 m (5' 8.3\")   Wt 85.8 kg (189 lb 1.6 oz)   LMP 03/27/2023 (Approximate)   SpO2 98%   BMI 28.50 kg/m    Body mass index is 28.5 kg/m .  Physical Exam   GENERAL: healthy, alert and no distress  ABDOMEN: bowel sounds normal and soft with left lower abdominal tenderness but no g/r/r   (female): normal female external genitalia, normal urethral meatus , vaginal mucosa pink, moist, well rugated and possible fullness on the left lower pelvis but no cervical motion tenderness     Results for orders placed or performed in visit on 05/09/23 (from the past 24 hour(s))   UA with Microscopic reflex to Culture - lab collect    Specimen: Urine, Midstream   Result Value Ref Range    Color Urine Yellow Colorless, Straw, Light Yellow, Yellow    Appearance Urine Clear Clear    Glucose Urine Negative Negative mg/dL    Bilirubin Urine Negative Negative    Ketones Urine Negative Negative mg/dL    Specific Gravity Urine 1.020 1.003 - 1.035    Blood Urine Negative Negative    pH Urine 7.0 5.0 - 7.0    Protein Albumin Urine Negative Negative mg/dL    Urobilinogen Urine 0.2 0.2, 1.0 E.U./dL    Nitrite " Urine Negative Negative    Leukocyte Esterase Urine Trace (A) Negative   UA Microscopic with Reflex to Culture   Result Value Ref Range    Bacteria Urine Few (A) None Seen /HPF    RBC Urine None Seen 0-2 /HPF /HPF    WBC Urine 0-5 0-5 /HPF /HPF    Squamous Epithelials Urine Few (A) None Seen /LPF    Amorphous Crystals Urine Many (A) None Seen /HPF    Narrative    Urine Culture not indicated   Wet prep - Clinic Collect    Specimen: Vagina; Swab   Result Value Ref Range    Trichomonas Absent Absent    Yeast Absent Absent    Clue Cells Absent Absent    WBCs/high power field 1+ (A) None

## 2023-05-10 LAB — HCG INTACT+B SERPL-ACNC: <1 MIU/ML

## 2023-05-11 PROBLEM — E78.41 ELEVATED LIPOPROTEIN(A): Status: ACTIVE | Noted: 2023-05-11

## 2023-05-12 ENCOUNTER — HOSPITAL ENCOUNTER (OUTPATIENT)
Dept: ULTRASOUND IMAGING | Facility: CLINIC | Age: 28
Discharge: HOME | End: 2023-05-12
Attending: FAMILY MEDICINE | Admitting: FAMILY MEDICINE
Payer: COMMERCIAL

## 2023-05-12 DIAGNOSIS — N91.2 AMENORRHEA: ICD-10-CM

## 2023-05-12 DIAGNOSIS — R10.2 PELVIC PAIN IN FEMALE: ICD-10-CM

## 2023-05-12 PROCEDURE — 76856 US EXAM PELVIC COMPLETE: CPT

## 2023-05-16 NOTE — RESULT ENCOUNTER NOTE
Dear Benita,   Your test results are all back -   Pelvic ultrasound is negative - no signs of ovarian cyst.  Please let me know if your symptoms persist.  Let us know if you have any questions.  -Gni Anne, DO

## 2023-05-29 DIAGNOSIS — F41.1 GENERALIZED ANXIETY DISORDER: ICD-10-CM

## 2023-05-29 DIAGNOSIS — F41.0 PANIC ATTACK: ICD-10-CM

## 2023-05-30 RX ORDER — LORAZEPAM 0.5 MG/1
TABLET ORAL
Qty: 15 TABLET | Refills: 0 | Status: SHIPPED | OUTPATIENT
Start: 2023-05-30 | End: 2023-08-17

## 2023-06-12 ENCOUNTER — THERAPY VISIT (OUTPATIENT)
Dept: PHYSICAL THERAPY | Facility: CLINIC | Age: 28
End: 2023-06-12
Payer: COMMERCIAL

## 2023-06-12 DIAGNOSIS — R35.0 URINARY FREQUENCY: ICD-10-CM

## 2023-06-12 DIAGNOSIS — N39.41 URGENCY INCONTINENCE: ICD-10-CM

## 2023-06-12 DIAGNOSIS — R10.2 PELVIC PAIN IN FEMALE: Primary | ICD-10-CM

## 2023-06-12 PROCEDURE — 97140 MANUAL THERAPY 1/> REGIONS: CPT | Mod: GP | Performed by: PHYSICAL THERAPIST

## 2023-06-12 PROCEDURE — 97110 THERAPEUTIC EXERCISES: CPT | Mod: GP | Performed by: PHYSICAL THERAPIST

## 2023-06-12 NOTE — PROGRESS NOTES
PLAN  Continue therapy per current plan of care.   06/12/23 0500   Appointment Info   Signing clinician's name / credentials Sofi [place] PT   Total/Authorized Visits E&T 8   Visits Used 3   Medical Diagnosis pelvic pain/urinary urgency   Other pertinent information verbal consent for internal PF muscle treatment   Progress Note/Certification   Onset of illness/injury or Date of Surgery 02/01/23   Therapy Frequency 1x month for   Predicted Duration 2 months   Progress Note Completed Date 04/10/23   GOALS   PT Goals 2   PT Goal 1   Goal Identifier pelvic pain   Goal Description pain free intercourse, pelvic exams   Rationale to maximize safety and independence with self cares   Goal Progress working toward goals- pt had pelvic exam in 5/23 with min to no pelvic pain, less intensity of pain with intercourse PL 6/10 with penetration- min pain during intercourse   Target Date 08/12/23   PT Goal 2   Goal Identifier voiding patterns   Goal Description decrease urgency in day  every 2-4 hrs,night 0-1 voids   Rationale to maximize safety and independence with self cares   Goal Progress progress toward goals: day: every 2 hrs, night: 0-1(usually does not get up to void)   Target Date 07/03/23   Subjective Report   Subjective Report Benita reports having L lower pelvic pain in early may- pelvic US/pelvic exam was normal. She reports less discomfort, almost none currently- taking more fiber less constipation issues. She purchased dilators , used them some however not in past month. She used the smallest dilator , mild discomfort. Less pain with intercourse- PL 6/10 most recent episode of sex. Less pain overall with sex. She does mention chronic LBP- seeing chiropractor since 1/2023.   Objective Measures   Objective Measures Objective Measure 1   Objective Measure 1   Objective Measure PFM tension/pain, awareness/strength   Details external assessment: perineal lift, hold and relaxation observed in supine. internal vaginal  "assessment: MMT 3+/5 8-10\" hold with improved relaxation phase. PT/ mm tension at 6  pelvic clock position and L>R LA. Good TrA activation in supine and stabilization with core toe tap ex.   Treatment Interventions (PT)   Interventions Therapeutic Procedure/Exercise;Manual Therapy   Therapeutic Procedure/Exercise   Therapeutic Procedures: strength, endurance, ROM, flexibillity minutes (89454) 24   PTRx Ther Proc 1 Pelvic Floor Muscle Strengthening Basic   PTRx Ther Proc 1 - Details 5 reps 5\" hold 10\" relax 3x daily   PTRx Ther Proc 2 Roll Outs   PTRx Ther Proc 2 - Details HEP   PTRx Ther Proc 3 Double Knee to Chest   PTRx Ther Proc 3 - Details HEP   PTRx Ther Proc 4 Pelvic Floor Muscle Strengthening Quick Flicks   PTRx Ther Proc 4 - Details HEP   PTRx Ther Proc 5 Standing Plie Squat    PTRx Ther Proc 5 - Details HEP   PTRx Ther Proc 6 Bridging #2A Weight Shift   PTRx Ther Proc 6 - Details 10 reps in combination with kegel    PTRx Ther Proc 7 Prone Press Ups   PTRx Ther Proc 7 - Details 8-10 reps 1-2x daily    PTRx Ther Proc 8 Standing Extension   PTRx Ther Proc 8 - Details 5 reps 3-4x daily   Therapeutic Activity   PTRx Ther Act 1 Pelvic Floor Anatomy and Function   PTRx Ther Act 1 - Details HEP    PTRx Ther Act 2 Toileting Position   PTRx Ther Act 2 - Details use stool under feet when having BM- ease of toileting   PTRx Ther Act 3 Functions of Fiber   PTRx Ther Act 3 - Details rev taking metamucil daily to decrease constipation issues   PTRx Ther Act 4 Diaphragmatic Breathing   PTRx Ther Act 4 - Details HEP   Manual Therapy   Manual Therapy: Mobilization, MFR, MLD, friction massage minutes (09312) 15   Manual Therapy Manual Therapy 2   Manual Therapy 1 MFR to LA 6 pelvic clock position, L>R   Manual Therapy 1 - Details supine position   Patient Response/Progress ninfa MFR well(discomfort during, no discomfort following)   Manual Therapy 2 review use of dilators at home, 3x weekly for 5' at a time.   Intervention " (Other)   PTRx Other  1 General Bladder Information   PTRx Other 1 - Details HEP   PTRx Other  2 Pelvic Myofascial Release  (MT)   PTRx Other 2 - Details review PF stretching with dilators or her finger to gently stretch PFM's   Education   Learner/Method Patient   Plan   Home program see PTRX   Plan for next session core, pt may bring in dilators-pt will check with her ins- possibly FU with LB eval at next visit due to chronic issues   Total Session Time   Timed Code Treatment Minutes 39   Total Treatment Time (sum of timed and untimed services) 39       Beginning/End Dates of Progress Note Reporting Period:  04/10/23 to 06/12/2023    Referring Provider:  Debby Salazar

## 2023-07-19 DIAGNOSIS — F51.04 PSYCHOPHYSIOLOGICAL INSOMNIA: ICD-10-CM

## 2023-07-20 RX ORDER — TRAZODONE HYDROCHLORIDE 50 MG/1
TABLET, FILM COATED ORAL
Qty: 30 TABLET | Refills: 0 | Status: SHIPPED | OUTPATIENT
Start: 2023-07-20 | End: 2023-10-06

## 2023-08-16 DIAGNOSIS — F41.0 PANIC ATTACK: ICD-10-CM

## 2023-08-16 DIAGNOSIS — F41.1 GENERALIZED ANXIETY DISORDER: ICD-10-CM

## 2023-08-17 RX ORDER — LORAZEPAM 0.5 MG/1
TABLET ORAL
Qty: 15 TABLET | Refills: 0 | Status: SHIPPED | OUTPATIENT
Start: 2023-08-17 | End: 2023-10-06

## 2023-08-28 ENCOUNTER — THERAPY VISIT (OUTPATIENT)
Dept: PHYSICAL THERAPY | Facility: CLINIC | Age: 28
End: 2023-08-28
Payer: COMMERCIAL

## 2023-08-28 DIAGNOSIS — M54.42 LEFT-SIDED LOW BACK PAIN WITH LEFT-SIDED SCIATICA: Primary | ICD-10-CM

## 2023-08-28 PROCEDURE — 97110 THERAPEUTIC EXERCISES: CPT | Mod: GP | Performed by: PHYSICAL THERAPIST

## 2023-08-28 PROCEDURE — 97161 PT EVAL LOW COMPLEX 20 MIN: CPT | Mod: GP | Performed by: PHYSICAL THERAPIST
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
## 2023-09-11 ENCOUNTER — THERAPY VISIT (OUTPATIENT)
Dept: PHYSICAL THERAPY | Facility: CLINIC | Age: 28
End: 2023-09-11
Payer: COMMERCIAL

## 2023-09-11 DIAGNOSIS — M54.42 LEFT-SIDED LOW BACK PAIN WITH LEFT-SIDED SCIATICA: Primary | ICD-10-CM

## 2023-09-11 PROCEDURE — 97110 THERAPEUTIC EXERCISES: CPT | Mod: GP | Performed by: PHYSICAL THERAPIST

## 2023-09-11 PROCEDURE — 97140 MANUAL THERAPY 1/> REGIONS: CPT | Mod: GP | Performed by: PHYSICAL THERAPIST

## 2023-09-13 ENCOUNTER — MYC MEDICAL ADVICE (OUTPATIENT)
Dept: FAMILY MEDICINE | Facility: CLINIC | Age: 28
End: 2023-09-13
Payer: COMMERCIAL

## 2023-09-13 DIAGNOSIS — Z00.00 ENCOUNTER FOR ROUTINE ADULT HEALTH EXAMINATION WITHOUT ABNORMAL FINDINGS: ICD-10-CM

## 2023-09-14 RX ORDER — DROSPIRENONE AND ETHINYL ESTRADIOL 0.02-3(28)
1 KIT ORAL DAILY
Qty: 84 TABLET | Refills: 0 | Status: SHIPPED | OUTPATIENT
Start: 2023-09-14 | End: 2023-10-06

## 2023-09-14 NOTE — TELEPHONE ENCOUNTER
Prescription approved per [facility] Refill Protocol.  Gemini MARQUEZ RN  [facility] [place]

## 2023-09-25 ENCOUNTER — THERAPY VISIT (OUTPATIENT)
Dept: PHYSICAL THERAPY | Facility: CLINIC | Age: 28
End: 2023-09-25
Payer: COMMERCIAL

## 2023-09-25 DIAGNOSIS — M54.42 LEFT-SIDED LOW BACK PAIN WITH LEFT-SIDED SCIATICA, UNSPECIFIED CHRONICITY: Primary | ICD-10-CM

## 2023-09-25 PROCEDURE — 97110 THERAPEUTIC EXERCISES: CPT | Mod: GP | Performed by: PHYSICAL THERAPIST

## 2023-09-25 PROCEDURE — 97140 MANUAL THERAPY 1/> REGIONS: CPT | Mod: GP | Performed by: PHYSICAL THERAPIST

## 2023-09-30 ENCOUNTER — HEALTH MAINTENANCE LETTER (OUTPATIENT)
Age: 28
End: 2023-09-30

## 2023-10-03 ASSESSMENT — ENCOUNTER SYMPTOMS
COUGH: 0
NAUSEA: 0
HEMATOCHEZIA: 0
CONSTIPATION: 0
NERVOUS/ANXIOUS: 1
PARESTHESIAS: 0
HEARTBURN: 0
DIARRHEA: 0
CHILLS: 0
EYE PAIN: 0
ARTHRALGIAS: 1
HEMATURIA: 0
BREAST MASS: 0
DYSURIA: 0
PALPITATIONS: 0
SORE THROAT: 0
HEADACHES: 0
SHORTNESS OF BREATH: 0
MYALGIAS: 0
WEAKNESS: 0
ABDOMINAL PAIN: 0
JOINT SWELLING: 0
DIZZINESS: 0
FREQUENCY: 0
FEVER: 0

## 2023-10-06 ENCOUNTER — OFFICE VISIT (OUTPATIENT)
Dept: FAMILY MEDICINE | Facility: CLINIC | Age: 28
End: 2023-10-06
Payer: COMMERCIAL

## 2023-10-06 VITALS
DIASTOLIC BLOOD PRESSURE: 82 MMHG | BODY MASS INDEX: 27.21 KG/M2 | TEMPERATURE: 97.2 F | RESPIRATION RATE: 16 BRPM | OXYGEN SATURATION: 100 % | HEIGHT: 69 IN | WEIGHT: 183.7 LBS | HEART RATE: 74 BPM | SYSTOLIC BLOOD PRESSURE: 120 MMHG

## 2023-10-06 DIAGNOSIS — Z00.00 ROUTINE GENERAL MEDICAL EXAMINATION AT A HEALTH CARE FACILITY: Primary | ICD-10-CM

## 2023-10-06 DIAGNOSIS — F41.0 PANIC ATTACK: ICD-10-CM

## 2023-10-06 DIAGNOSIS — F51.04 PSYCHOPHYSIOLOGICAL INSOMNIA: ICD-10-CM

## 2023-10-06 DIAGNOSIS — F41.1 GENERALIZED ANXIETY DISORDER: ICD-10-CM

## 2023-10-06 DIAGNOSIS — Z00.00 ENCOUNTER FOR ROUTINE ADULT HEALTH EXAMINATION WITHOUT ABNORMAL FINDINGS: ICD-10-CM

## 2023-10-06 PROCEDURE — 99213 OFFICE O/P EST LOW 20 MIN: CPT | Mod: 25 | Performed by: FAMILY MEDICINE

## 2023-10-06 PROCEDURE — 96127 BRIEF EMOTIONAL/BEHAV ASSMT: CPT | Performed by: FAMILY MEDICINE

## 2023-10-06 PROCEDURE — 99395 PREV VISIT EST AGE 18-39: CPT | Mod: 25 | Performed by: FAMILY MEDICINE

## 2023-10-06 PROCEDURE — 90686 IIV4 VACC NO PRSV 0.5 ML IM: CPT | Performed by: FAMILY MEDICINE

## 2023-10-06 PROCEDURE — 90471 IMMUNIZATION ADMIN: CPT | Performed by: FAMILY MEDICINE

## 2023-10-06 RX ORDER — DROSPIRENONE AND ETHINYL ESTRADIOL 0.02-3(28)
1 KIT ORAL DAILY
Qty: 84 TABLET | Refills: 3 | Status: SHIPPED | OUTPATIENT
Start: 2023-10-06 | End: 2024-04-17

## 2023-10-06 RX ORDER — TRAZODONE HYDROCHLORIDE 50 MG/1
25-50 TABLET, FILM COATED ORAL AT BEDTIME
Qty: 30 TABLET | Refills: 4 | Status: SHIPPED | OUTPATIENT
Start: 2023-10-06 | End: 2024-04-17

## 2023-10-06 RX ORDER — LORAZEPAM 0.5 MG/1
0.5 TABLET ORAL EVERY 8 HOURS PRN
Qty: 15 TABLET | Refills: 0 | Status: SHIPPED | OUTPATIENT
Start: 2023-10-06 | End: 2024-01-02

## 2023-10-06 ASSESSMENT — ENCOUNTER SYMPTOMS
DIARRHEA: 0
DYSURIA: 0
HEMATURIA: 0
BREAST MASS: 0
PARESTHESIAS: 0
ABDOMINAL PAIN: 0
WEAKNESS: 0
FREQUENCY: 0
JOINT SWELLING: 0
DIZZINESS: 0
CONSTIPATION: 0
EYE PAIN: 0
HEARTBURN: 0
COUGH: 0
NERVOUS/ANXIOUS: 1
CHILLS: 0
ARTHRALGIAS: 1
HEADACHES: 0
HEMATOCHEZIA: 0
SORE THROAT: 0
FEVER: 0
PALPITATIONS: 0
NAUSEA: 0
SHORTNESS OF BREATH: 0
MYALGIAS: 0

## 2023-10-06 ASSESSMENT — PATIENT HEALTH QUESTIONNAIRE - PHQ9
10. IF YOU CHECKED OFF ANY PROBLEMS, HOW DIFFICULT HAVE THESE PROBLEMS MADE IT FOR YOU TO DO YOUR WORK, TAKE CARE OF THINGS AT HOME, OR GET ALONG WITH OTHER PEOPLE: VERY DIFFICULT
SUM OF ALL RESPONSES TO PHQ QUESTIONS 1-9: 7
SUM OF ALL RESPONSES TO PHQ QUESTIONS 1-9: 7

## 2023-10-06 ASSESSMENT — ANXIETY QUESTIONNAIRES
1. FEELING NERVOUS, ANXIOUS, OR ON EDGE: NEARLY EVERY DAY
2. NOT BEING ABLE TO STOP OR CONTROL WORRYING: NEARLY EVERY DAY
7. FEELING AFRAID AS IF SOMETHING AWFUL MIGHT HAPPEN: NOT AT ALL
5. BEING SO RESTLESS THAT IT IS HARD TO SIT STILL: SEVERAL DAYS
GAD7 TOTAL SCORE: 16
3. WORRYING TOO MUCH ABOUT DIFFERENT THINGS: NEARLY EVERY DAY
4. TROUBLE RELAXING: NEARLY EVERY DAY
GAD7 TOTAL SCORE: 16
IF YOU CHECKED OFF ANY PROBLEMS ON THIS QUESTIONNAIRE, HOW DIFFICULT HAVE THESE PROBLEMS MADE IT FOR YOU TO DO YOUR WORK, TAKE CARE OF THINGS AT HOME, OR GET ALONG WITH OTHER PEOPLE: SOMEWHAT DIFFICULT
6. BECOMING EASILY ANNOYED OR IRRITABLE: NEARLY EVERY DAY

## 2023-10-06 ASSESSMENT — PAIN SCALES - GENERAL: PAINLEVEL: NO PAIN (0)

## 2023-10-06 NOTE — PROGRESS NOTES
SUBJECTIVE:   CC: Benita is an 28 year old who presents for preventive health visit.       10/6/2023     9:14 AM   Additional Questions   Roomed by Yarelis MEEHAN       Healthy Habits:     Getting at least 3 servings of Calcium per day:  Yes    Bi-annual eye exam:  Yes    Dental care twice a year:  Yes    Sleep apnea or symptoms of sleep apnea:  None    Diet:  Carbohydrate counting    Frequency of exercise:  6-7 days/week    Duration of exercise:  Greater than 60 minutes    Taking medications regularly:  Yes    Medication side effects:  None    Additional concerns today:  No      Today's PHQ-9 Score:       10/6/2023     9:03 AM   PHQ-9 SCORE   PHQ-9 Total Score MyChart 7 (Mild depression)   PHQ-9 Total Score 7                 Anxiety Follow-Up  How are you doing with your anxiety since your last visit? Improved    Are you having other symptoms that might be associated with anxiety? No  Have you had a significant life event? OTHER: will get  soon    Are you feeling depressed? No  Do you have any concerns with your use of alcohol or other drugs? No    Social History     Tobacco Use    Smoking status: Never     Passive exposure: Never    Smokeless tobacco: Never   Vaping Use    Vaping Use: Never used   Substance Use Topics    Alcohol use: Yes     Comment: soc- wine    Drug use: Never         8/1/2022     1:12 PM 5/9/2023     2:12 PM 10/6/2023     9:03 AM   MOUNA-7 SCORE   Total Score 19 (severe anxiety) 20 (severe anxiety) 16 (severe anxiety)   Total Score 19 20 16         8/19/2022     8:02 AM 5/9/2023     2:12 PM 10/6/2023     9:03 AM   PHQ   PHQ-9 Total Score 2 10 7   Q9: Thoughts of better off dead/self-harm past 2 weeks Not at all Not at all Not at all         10/6/2023     9:03 AM   Last PHQ-9   1.  Little interest or pleasure in doing things 0   2.  Feeling down, depressed, or hopeless 1   3.  Trouble falling or staying asleep, or sleeping too much 2   4.  Feeling tired or having little energy 0   5.  Poor  appetite or overeating 0   6.  Feeling bad about yourself 1   7.  Trouble concentrating 3   8.  Moving slowly or restless 0   Q9: Thoughts of better off dead/self-harm past 2 weeks 0   PHQ-9 Total Score 7         10/6/2023     9:03 AM   MOUNA-7    1. Feeling nervous, anxious, or on edge 3   2. Not being able to stop or control worrying 3   3. Worrying too much about different things 3   4. Trouble relaxing 3   5. Being so restless that it is hard to sit still 1   6. Becoming easily annoyed or irritable 3   7. Feeling afraid, as if something awful might happen 0   MOUNA-7 Total Score 16   If you checked any problems, how difficult have they made it for you to do your work, take care of things at home, or get along with other people? Somewhat difficult         Social History     Tobacco Use    Smoking status: Never     Passive exposure: Never    Smokeless tobacco: Never   Substance Use Topics    Alcohol use: Yes     Comment: soc- wine             10/3/2023     4:08 PM   Alcohol Use   Prescreen: >3 drinks/day or >7 drinks/week? No     Reviewed orders with patient.  Reviewed health maintenance and updated orders accordingly - Yes  Patient Active Problem List   Diagnosis    Generalized anxiety disorder    Hypercholesterolemia with hypertriglyceridemia    Acne, unspecified acne type    BMI 27.0-27.9,adult    Hyperinsulinemia    Metabolic syndrome X    Dyspareunia in female    Pelvic pain in female    Urinary frequency    Urgency incontinence    Elevated lipoprotein(a)    Left-sided low back pain with left-sided sciatica     Past Surgical History:   Procedure Laterality Date    DENTAL SURGERY      [place] teeth extraction       Social History     Tobacco Use    Smoking status: Never     Passive exposure: Never    Smokeless tobacco: Never   Substance Use Topics    Alcohol use: Yes     Comment: soc- wine     Family History   Problem Relation Age of Onset    Hyperlipidemia Mother     Hyperlipidemia Maternal Grandmother      Alzheimer Disease Paternal Grandmother     Alcoholism Paternal Grandmother     Depression Paternal Grandmother     Mental Illness Paternal Grandmother     Substance Abuse Paternal Grandmother     Breast Cancer Paternal Grandmother     Coronary Artery Disease Paternal Grandfather     Alcoholism Paternal Grandfather     Substance Abuse Paternal Grandfather     Coronary Artery Disease Maternal Uncle     Hyperlipidemia Father            Breast Cancer Screenin/19/2021     9:21 AM   Breast CA Risk Assessment (FHS-7)   Do you have a family history of breast, colon, or ovarian cancer? No / Unknown         Patient under 40 years of age: Routine Mammogram Screening not recommended.   Pertinent mammograms are reviewed under the imaging tab.    History of abnormal Pap smear: NO - age 30- 65 PAP every 3 years recommended      Latest Ref Rng & Units 2022    10:22 AM 2019     4:08 PM   PAP / HPV   PAP  Negative for Intraepithelial Lesion or Malignancy (NILM)     PAP (Historical)   NIL    HPV 16 DNA Negative Negative     HPV 18 DNA Negative Negative     Other HR HPV Negative Negative       Reviewed and updated as needed this visit by clinical staff   Tobacco  Allergies  Meds  Problems  Med Hx  Surg Hx  Fam Hx          Reviewed and updated as needed this visit by Provider   Tobacco  Allergies  Meds  Problems  Med Hx  Surg Hx  Fam Hx             Review of Systems   Constitutional:  Negative for chills and fever.   HENT:  Negative for congestion, ear pain, hearing loss and sore throat.    Eyes:  Negative for pain and visual disturbance.   Respiratory:  Negative for cough and shortness of breath.    Cardiovascular:  Negative for chest pain, palpitations and peripheral edema.   Gastrointestinal:  Negative for abdominal pain, constipation, diarrhea, heartburn, hematochezia and nausea.   Breasts:  Negative for tenderness, breast mass and discharge.   Genitourinary:  Positive for pelvic pain. Negative for  "dysuria, frequency, genital sores, hematuria, urgency, vaginal bleeding and vaginal discharge.   Musculoskeletal:  Positive for arthralgias. Negative for joint swelling and myalgias.   Skin:  Negative for rash.   Neurological:  Negative for dizziness, weakness, headaches and paresthesias.   Psychiatric/Behavioral:  Negative for mood changes. The patient is nervous/anxious.           OBJECTIVE:   /82 (BP Location: Left arm, Patient Position: Sitting, Cuff Size: Adult Regular)   Pulse 74   Temp 97.2  F (36.2  C) (Temporal)   Resp 16   Ht 1.759 m (5' 9.25\")   Wt 83.3 kg (183 lb 11.2 oz)   LMP 09/11/2023 (Exact Date)   SpO2 100%   BMI 26.93 kg/m    Physical Exam  GENERAL: healthy, alert and no distress  EYES: Eyes grossly normal to inspection, PERRL and conjunctivae and sclerae normal  HENT: ear canals and TM's normal, nose and mouth without ulcers or lesions  NECK: no adenopathy, no asymmetry, masses, or scars and thyroid normal to palpation  RESP: lungs clear to auscultation - no rales, rhonchi or wheezes  BREAST: normal without masses, tenderness or nipple discharge and no palpable axillary masses or adenopathy  CV: regular rate and rhythm, normal S1 S2, no S3 or S4, no murmur, click or rub, no peripheral edema and peripheral pulses strong  ABDOMEN: soft, nontender, no hepatosplenomegaly, no masses and bowel sounds normal  MS: no gross musculoskeletal defects noted, no edema  SKIN: no suspicious lesions or rashes  NEURO: Normal strength and tone, mentation intact and speech normal  PSYCH: mentation appears normal, affect normal/bright    Diagnostic Test Results:  Labs reviewed in Epic    ASSESSMENT/PLAN:   (Z00.00) Routine general medical examination at a health care facility  (primary encounter diagnosis)  Comment:    Plan:      (F51.04) Psychophysiological insomnia  Comment:    Plan: traZODone (DESYREL) 50 MG tablet         Refilled -     (F41.0) Panic attack  Comment:  using prn   Has 1/2 script left "   Did refill today as she is getting closer to her wedding and more anxiety   Could also try to take trazodone 25mg more nightly - may also help   Plan: LORazepam (ATIVAN) 0.5 MG tablet             (F41.1) Generalized anxiety disorder  Comment:    Plan: LORazepam (ATIVAN) 0.5 MG tablet             (Z00.00) Encounter for routine adult health examination without abnormal findings  Comment:  refilled   May stopped after gets  Feb 2024  Plan: drospirenone-ethinyl estradiol (MELISSA) 3-0.02 MG         tablet                   COUNSELING:  Reviewed preventive health counseling, as reflected in patient instructions        She reports that she has never smoked. She has never been exposed to tobacco smoke. She has never used smokeless tobacco.          Gin Anne, [facility] submitted by the patient for this visit:  Patient Health Questionnaire (Submitted on 10/6/2023)  If you checked off any problems, how difficult have these problems made it for you to do your work, take care of things at home, or get along with other people?: Very difficult  PHQ9 TOTAL SCORE: 7  MOUNA-7 (Submitted on 10/6/2023)  MOUNA 7 TOTAL SCORE: 16

## 2023-10-06 NOTE — NURSING NOTE
Prior to immunization administration, verified patients identity using patient s name and date of birth. Please see Immunization Activity for additional information.     Screening Questionnaire for Adult Immunization    Are you sick today?   No   Do you have allergies to medications, food, a vaccine component or latex?   No   Have you ever had a serious reaction after receiving a vaccination?   No   Do you have a long-term health problem with heart, lung, kidney, or metabolic disease (e.g., diabetes), asthma, a blood disorder, no spleen, complement component deficiency, a cochlear implant, or a spinal fluid leak?  Are you on long-term aspirin therapy?   No   Do you have cancer, leukemia, HIV/AIDS, or any other immune system problem?   No   Do you have a parent, brother, or sister with an immune system problem?   No   In the past 3 months, have you taken medications that affect  your immune system, such as prednisone, other steroids, or anticancer drugs; drugs for the treatment of rheumatoid arthritis, Crohn s disease, or psoriasis; or have you had radiation treatments?   No   Have you had a seizure, or a brain or other nervous system problem?   No   During the past year, have you received a transfusion of blood or blood    products, or been given immune (gamma) globulin or antiviral drug?   No   For women: Are you pregnant or is there a chance you could become       pregnant during the next month?   No   Have you received any vaccinations in the past 4 weeks?   No     Immunization questionnaire answers were all negative.      Patient instructed to remain in clinic for 15 minutes afterwards, and to report any adverse reactions.     Screening performed by Sai Lin MA on 10/6/2023 at 10:09 AM.

## 2023-10-09 ENCOUNTER — THERAPY VISIT (OUTPATIENT)
Dept: PHYSICAL THERAPY | Facility: CLINIC | Age: 28
End: 2023-10-09
Payer: COMMERCIAL

## 2023-10-09 DIAGNOSIS — R35.0 URINARY FREQUENCY: ICD-10-CM

## 2023-10-09 DIAGNOSIS — R10.2 PELVIC PAIN IN FEMALE: Primary | ICD-10-CM

## 2023-10-09 DIAGNOSIS — N39.41 URGENCY INCONTINENCE: ICD-10-CM

## 2023-10-09 PROCEDURE — 97110 THERAPEUTIC EXERCISES: CPT | Mod: GP | Performed by: PHYSICAL THERAPIST

## 2023-10-09 PROCEDURE — 97140 MANUAL THERAPY 1/> REGIONS: CPT | Mod: GP | Performed by: PHYSICAL THERAPIST

## 2023-10-09 NOTE — PROGRESS NOTES
"    PLAN  Continue therapy per current plan of care.   10/09/23 0500   Appointment Info   Signing clinician's name / credentials Sofi Potter PT   Total/Authorized Visits E&T 8   Visits Used 4   Medical Diagnosis pelvic pain/urinary urgency   Other pertinent information verbal consent for internal PF muscle treatment   Quick Adds Pelvic Consent   Progress Note/Certification   Onset of illness/injury or Date of Surgery 02/01/23   Therapy Frequency 1x month for   Predicted Duration 2 months   Progress Note Completed Date 06/12/23   GOALS   PT Goals 2   PT Goal 1   Goal Identifier pelvic pain   Goal Description pain free intercourse, pelvic exams   Rationale to maximize safety and independence with self cares   Goal Progress PL 4-5 with penetration, during - no pain   Target Date 12/04/23  (adjust date- progress made)   PT Goal 2   Goal Identifier voiding patterns   Goal Description decrease urgency in day  every 2-4 hrs,night 0-1 voids   Rationale to maximize safety and independence with self cares   Goal Progress void every 2-3 hrs day, night 0-1   Target Date 08/12/23   Date Met 10/09/23   Subjective Report   Subjective Report Benita reports less pain with intercourse- PL 4-5 with penetration but no pain during sex. She is using dilators every other week - less tight with the stretching. Normal urinary frequency in day and night.   Objective Measures   Objective Measures Objective Measure 1   Objective Measure 1   Objective Measure PFM tension/pain, awareness/strength   Details external assessment: perineal lift,10 sec hold with good relaxation phase, internal vaginal assessment: MMT 4/5 8-10\" hold , repeateable with quick flicks. Decreased point tender/ mm tension at levator ani- 6  pelvic clock position and L>R LA. point tenderness/ muscle tension at L obturator innternus.   Treatment Interventions (PT)   Interventions Therapeutic Procedure/Exercise;Manual Therapy   Therapeutic Procedure/Exercise   Therapeutic " Procedures: strength, endurance, ROM, flexibillity minutes (27979) 15   PTRx Ther Proc 1 Pelvic Floor Muscle Strengthening Basic   PTRx Ther Proc 1 - Details HEp   PTRx Ther Proc 2 Roll Outs   PTRx Ther Proc 2 - Details HEP   PTRx Ther Proc 3 Double Knee to Chest   PTRx Ther Proc 3 - Details HEP   PTRx Ther Proc 4 Pelvic Floor Muscle Strengthening Quick Flicks   PTRx Ther Proc 4 - Details HEP   PTRx Ther Proc 5 Standing Plie Squat    PTRx Ther Proc 5 - Details HEP   PTRx Ther Proc 6 Bridging #2A Weight Shift   PTRx Ther Proc 6 - Details 10 reps in combination with kegel    PTRx Ther Proc 7 Prone Press Ups   PTRx Ther Proc 7 - Details 8-10 reps 1-2x daily    PTRx Ther Proc 8 Standing Extension   PTRx Ther Proc 8 - Details 5 reps 3-4x daily   Patient Response/Progress review of HEP- ninfa  and progressing well   Therapeutic Activity   PTRx Ther Act 1 Pelvic Floor Anatomy and Function   PTRx Ther Act 1 - Details HEP    PTRx Ther Act 2 Toileting Position   PTRx Ther Act 2 - Details use stool under feet when having BM- ease of toileting   PTRx Ther Act 3 Functions of Fiber   PTRx Ther Act 3 - Details rev taking metamucil daily to decrease constipation issues   PTRx Ther Act 4 Diaphragmatic Breathing   PTRx Ther Act 4 - Details HEP   Manual Therapy   Manual Therapy: Mobilization, MFR, MLD, friction massage minutes (68975) 20   Manual Therapy Manual Therapy 2   Manual Therapy 1 MFR to LA 6 pelvic clock position, L>R. OI on L in prone position   Manual Therapy 1 - Details supine/prone position   Manual Therapy 2 review use of dilators at home, 3x weekly for 5' at a time.   Patient Response/Progress ninfa MFR well(less discomfort overall, no discomfort following) PT L OI in prone position   Intervention (Other)   PTRx Other  1 General Bladder Information   PTRx Other 1 - Details HEP   PTRx Other  2 Pelvic Myofascial Release  (MT)   PTRx Other 2 - Details hep   Education   Learner/Method Patient;Listening;Pictures/Video   Plan    Home program see PTRX   Updates [facility] of care [facility]- add MFR to L OI   Plan for next session FU in 1 month   Comments   Pelvic Health Informed Consent Statement Discussed with patient/guardian reason for referral regarding pelvic health needs and external/internal pelvic floor muscle examination.  Opportunity provided to ask questions and verbal consent for assessment and intervention was given.   Total Session Time   Timed Code Treatment Minutes 35   Total Treatment Time (sum of timed and untimed services) 35       Beginning/End Dates of Progress Note Reporting Period:  06/12/23 to 10/09/2023    Referring Provider:  Referred Self

## 2023-10-17 DIAGNOSIS — R23.2 FLUSHING: ICD-10-CM

## 2023-10-17 RX ORDER — PROPRANOLOL HYDROCHLORIDE 40 MG/1
TABLET ORAL
Qty: 60 TABLET | Refills: 1 | Status: SHIPPED | OUTPATIENT
Start: 2023-10-17 | End: 2024-02-27

## 2023-10-18 DIAGNOSIS — R23.2 FLUSHING: ICD-10-CM

## 2023-10-18 RX ORDER — PROPRANOLOL HYDROCHLORIDE 40 MG/1
TABLET ORAL
Qty: 180 TABLET | OUTPATIENT
Start: 2023-10-18

## 2023-10-30 ENCOUNTER — MYC MEDICAL ADVICE (OUTPATIENT)
Dept: FAMILY MEDICINE | Facility: CLINIC | Age: 28
End: 2023-10-30
Payer: COMMERCIAL

## 2023-10-30 DIAGNOSIS — F41.1 GENERALIZED ANXIETY DISORDER: Primary | ICD-10-CM

## 2023-10-31 RX ORDER — CITALOPRAM HYDROBROMIDE 10 MG/1
TABLET ORAL
Qty: 30 TABLET | Refills: 1 | Status: SHIPPED | OUTPATIENT
Start: 2023-10-31 | End: 2024-04-17

## 2023-11-13 ENCOUNTER — THERAPY VISIT (OUTPATIENT)
Dept: PHYSICAL THERAPY | Facility: CLINIC | Age: 28
End: 2023-11-13
Payer: COMMERCIAL

## 2023-11-13 DIAGNOSIS — M54.42 LEFT-SIDED LOW BACK PAIN WITH LEFT-SIDED SCIATICA, UNSPECIFIED CHRONICITY: Primary | ICD-10-CM

## 2023-11-13 PROCEDURE — 97530 THERAPEUTIC ACTIVITIES: CPT | Mod: GP | Performed by: PHYSICAL THERAPIST

## 2023-11-13 PROCEDURE — 97110 THERAPEUTIC EXERCISES: CPT | Mod: GP | Performed by: PHYSICAL THERAPIST

## 2023-11-13 PROCEDURE — 97140 MANUAL THERAPY 1/> REGIONS: CPT | Mod: GP | Performed by: PHYSICAL THERAPIST

## 2023-11-13 NOTE — PROGRESS NOTES
"    PLAN  Continue therapy per current plan of care.   11/13/23 0500   Appointment Info   Signing clinician's name / credentials Sofi Potter PT   Total/Authorized Visits E&T 6, self referred   Visits Used 4   Medical Diagnosis LBP   PT Tx Diagnosis L LBP /sciatica   Quick Adds Self Referred   Self Referred   Self Referred (PT) Yes   MD order needed by: 11/26/2023   Progress Note/Certification   Start of Care Date 08/28/23   Onset of illness/injury or Date of Surgery 10/01/22   Therapy Frequency 1x week for 4 weeks then 2x month for   Predicted Duration 2 months   Progress Note Completed Date 08/28/23   PT Goal 1   Goal Identifier sitting tolerance   Goal Description Pt will sit for 45 min with PL 1-2   Rationale to maximize safety and independence with performance of ADLs and functional tasks;to maximize safety and independence with self cares;to maximize safety and independence with transportation   Goal Progress good progress toward goal- 45 min PL 2   Target Date 11/26/23   Subjective Report   Subjective Report Benita reports good improvement overall in LB- increased running to 3x week -2 miles(mild L glut + running) increased weight training gradually and tolerating fairly well- some mild L glut pain with lunges and squats. Prolonged sitting still aggravtes the LB however tolerates longer time sitting and less intensity of pain.   Objective Measures   Objective Measures Objective Measure 1   Objective Measure 1   Objective Measure Increased LROM: FLX hands to feet- pain free, EXT nil loss- NE, SB L nil loss- mild increase L glut +. Increased flexibility B hamstring, piriformis. Increased strength: L hip ABD 5-/5, FLX 5-//5. Improved TrA/gluteal activation and stabilization with core and gluteal strengthening exercises. L glut/knee discomfort with 6\" side step. PT/ muscle tension L piriformis, QL.   Treatment Interventions (PT)   Interventions Therapeutic Procedure/Exercise;Manual Therapy;Therapeutic Activity " "  Therapeutic Procedure/Exercise   Therapeutic Procedures: strength, endurance, ROM, flexibillity minutes (96712) 10   Therapeutic Procedures Ther Proc 2;Ther Proc 3;Ther Proc 4;Ther Proc 5;Ther Proc 6;Ther Proc 7;Ther Proc 8   Ther Proc 1 press ups   Ther Proc 1 - Details 10 - NE during or after increased ROM   Ther Proc 2 EIS   Ther Proc 2 - Details HEP   Ther Proc 3 clam in SL with TB   Ther Proc 3 - Details rev   Ther Proc 4 hamstring st   Ther Proc 4 - Details HEP   Ther Proc 5 toe tap- core   Ther Proc 5 - Details 5 reps- progress to double toe tap   Ther Proc 6 SLR ABD in SL   Ther Proc 6 - Details HEP   Ther Proc 7 bridge #5   Ther Proc 7 - Details rev   Ther Proc 8 prone arm/leg raise   Ther Proc 8 - Details rev   Patient Response/Progress ninfa well   Therapeutic Activity   Therapeutic Activities: dynamic activities to improve functional performance minutes (83134) 12   Therapeutic Activities Ther Act 2;Ther Act 3   Ther Act 1 side step 6\" B   Ther Act 1 - Details 10 reps on L(mild L glut + during, no worse)   Patient Response/Progress ninfa well- mild L glut pain with side lunge   Ther Act 2 side lunge B   Ther Act 2 - Details 10 reps with good technique   Ther Act 3 monster walk with TB   Ther Act 3 - Details 10 steps alternating   Manual Therapy   Manual Therapy: Mobilization, MFR, MLD, friction massage minutes (24266) 15   Manual Therapy 1 prone - lumbar PA's, L  MFR piriformis, OI   Patient Response/Progress Decreased PT/ muscle tension L OI   Education   Learner/Method Patient;Listening;Pictures/Video   Plan   Home program see PTRX   Updates to plan of care [place]- side lunge, side step, monster walk   Plan for next session FU in 3 weeks - progress with L GM strengthening on step and core   Total Session Time   Timed Code Treatment Minutes 37   Total Treatment Time (sum of timed and untimed services) 37         Beginning/End Dates of Progress Note Reporting Period:  08/28/23 to 11/13/2023    Referring " Provider:  Referred Self

## 2023-11-27 ENCOUNTER — MYC MEDICAL ADVICE (OUTPATIENT)
Dept: FAMILY MEDICINE | Facility: CLINIC | Age: 28
End: 2023-11-27

## 2023-11-27 ENCOUNTER — E-VISIT (OUTPATIENT)
Dept: FAMILY MEDICINE | Facility: CLINIC | Age: 28
End: 2023-11-27
Payer: COMMERCIAL

## 2023-11-27 DIAGNOSIS — M54.50 BILATERAL LOW BACK PAIN WITHOUT SCIATICA, UNSPECIFIED CHRONICITY: ICD-10-CM

## 2023-11-27 DIAGNOSIS — F41.1 GENERALIZED ANXIETY DISORDER: Primary | ICD-10-CM

## 2023-11-27 DIAGNOSIS — N94.10 DYSPAREUNIA, FEMALE: Primary | ICD-10-CM

## 2023-11-27 DIAGNOSIS — R10.2 PELVIC PAIN IN FEMALE: ICD-10-CM

## 2023-11-27 PROCEDURE — 99421 OL DIG E/M SVC 5-10 MIN: CPT | Performed by: FAMILY MEDICINE

## 2023-11-27 ASSESSMENT — ANXIETY QUESTIONNAIRES
6. BECOMING EASILY ANNOYED OR IRRITABLE: SEVERAL DAYS
7. FEELING AFRAID AS IF SOMETHING AWFUL MIGHT HAPPEN: MORE THAN HALF THE DAYS
4. TROUBLE RELAXING: MORE THAN HALF THE DAYS
3. WORRYING TOO MUCH ABOUT DIFFERENT THINGS: MORE THAN HALF THE DAYS
2. NOT BEING ABLE TO STOP OR CONTROL WORRYING: NEARLY EVERY DAY
1. FEELING NERVOUS, ANXIOUS, OR ON EDGE: NEARLY EVERY DAY
GAD7 TOTAL SCORE: 15
5. BEING SO RESTLESS THAT IT IS HARD TO SIT STILL: MORE THAN HALF THE DAYS
GAD7 TOTAL SCORE: 15

## 2023-11-27 NOTE — TELEPHONE ENCOUNTER
PN,  Please see below [employer] message and advise.  Pended order to match last year.  Thanks,  Noemy BROUSSARD RN

## 2023-11-28 RX ORDER — BUSPIRONE HYDROCHLORIDE 5 MG/1
TABLET ORAL
Qty: 126 TABLET | Refills: 0 | Status: SHIPPED | OUTPATIENT
Start: 2023-11-28 | End: 2024-04-17

## 2023-11-28 ASSESSMENT — ANXIETY QUESTIONNAIRES: GAD7 TOTAL SCORE: 15

## 2023-12-04 ENCOUNTER — THERAPY VISIT (OUTPATIENT)
Dept: PHYSICAL THERAPY | Facility: CLINIC | Age: 28
End: 2023-12-04
Payer: COMMERCIAL

## 2023-12-04 DIAGNOSIS — M54.42 LEFT-SIDED LOW BACK PAIN WITH LEFT-SIDED SCIATICA, UNSPECIFIED CHRONICITY: Primary | ICD-10-CM

## 2023-12-04 PROCEDURE — 97530 THERAPEUTIC ACTIVITIES: CPT | Mod: GP | Performed by: PHYSICAL THERAPIST

## 2023-12-04 PROCEDURE — 97140 MANUAL THERAPY 1/> REGIONS: CPT | Mod: GP | Performed by: PHYSICAL THERAPIST

## 2023-12-04 PROCEDURE — 97110 THERAPEUTIC EXERCISES: CPT | Mod: GP | Performed by: PHYSICAL THERAPIST

## 2023-12-27 NOTE — PROGRESS NOTES
[facility]  Sports Medicine Clinic  Cl[facility]ics and Surgery Center           SUBJECTIVE       Benita Robbins is a 28 year old female presenting to clinic today. Today, the patient reports that she has had left foot pain for about 6 months after wearing heels and unsupportive shoes. The pain is located over her MT heads. She has been icing and wearing tennis shoes as well as house shoes. Pain has worsened over time. Denies any injuries. She will notice some numbness with prolonged weightbearing activities or wearing flat or heels. She has tried ibuprofen prn.     Background:   Occupation: Insurance  Hand Dominance (If pertinent): NA    Injury (Y/N): No  Work Comp (Y/N): No  Date of injury: None  Mechanism of Injury: No injury, after wearing unsupportive shoes    Duration of symptoms: 6 months   Intensity (1-10): 8   Aggravating factors: flats/heels   Relieving Factors: rest, ice and tennis shoes   Prior Evaluation: No   Previous Surgery on the area (Y/N): No   Physical Therapy (Previous/Current/None): No    Physical Activity/Exercise (What, How Often): Run, every day and strength training      PMH, Medications and Allergies were reviewed and updated as needed.    ROS:  As noted above otherwise negative.    Patient Active Problem List   Diagnosis    Generalized anxiety disorder    Hypercholesterolemia with hypertriglyceridemia    Acne, unspecified acne type    BMI 27.0-27.9,adult    Hyperinsulinemia    Metabolic syndrome X    Dyspareunia in female    Pelvic pain in female    Urinary frequency    Urgency incontinence    Elevated lipoprotein(a)    Left-sided low back pain with left-sided sciatica       Current Outpatient Medications   Medication Sig Dispense Refill    drospirenone-ethinyl estradiol (MELISSA) 3-0.02 MG tablet Take 1 tablet by mouth daily 84 tablet 3    LORazepam (ATIVAN) 0.5 MG tablet Take 1 tablet (0.5 mg) by mouth every 8 hours as needed for anxiety 15 tablet 0    naproxen (NAPROSYN) 500 MG  tablet Take 1 tablet (500 mg) by mouth 2 times daily (with meals) for 10 days 20 tablet 0    propranolol (INDERAL) 40 MG tablet TAKE 1 TABLET(40 MG) BY MOUTH EVERY 12 HOURS AS NEEDED FOR ANXIETY 60 tablet 1    spironolactone (ALDACTONE) 100 MG tablet Take 1 tablet (100 mg) by mouth daily      traZODone (DESYREL) 50 MG tablet Take 0.5-1 tablets (25-50 mg) by mouth at bedtime 30 tablet 4    busPIRone (BUSPAR) 5 MG tablet Take 1 tablet (5 mg) by mouth 2 times daily for 7 days, THEN 2 tablets (10 mg) 2 times daily for 7 days, THEN 3 tablets (15 mg) 2 times daily for 14 days. 126 tablet 0    citalopram (CELEXA) 10 MG tablet Start 5mg daily for 1 week then increase to 1 tablet (10mg) daily 30 tablet 1            OBJECTIVE:       Vitals: There were no vitals filed for this visit.  BMI: There is no height or weight on file to calculate BMI.    Gen:  Well nourished and in no acute distress  HEENT: Extraocular movement intact  Neck: Supple  Pulm:  Breathing Comfortably. No increased respiratory effort.  Psych: Euthymic. Appropriately answers questions    MSK: Left lower extremity without evidence of edema or ecchymosis.  Pending evaluation does show external rotation of the bilateral feet, bilateral pes planus, and hyperpronation of the left foot.  Tenderness to palpation at the lateral cuneiform, medial tibial region from 1 cm above the medial malleolus extending roughly 7 cm proximally, at the intermetatarsal regions of the third through fifth phalanx, and the fourth and fifth metatarsal heads.  Full active and passive range of motion of the left ankle.  Strength is 5 out of 5 in all directions of the left ankle as well as knee.  Negative anterior drawer, talar tilt, and external rotation.  Negative tib-fib squeeze, although painful on the medial side while pressing.              ASSESSMENT and PLAN:     Benita was seen today for pain.    Diagnoses and all orders for this visit:    Left medial tibial stress syndrome,  initial encounter  -     Vitamin D Deficiency; Future  -     naproxen (NAPROSYN) 500 MG tablet; Take 1 tablet (500 mg) by mouth 2 times daily (with meals) for 10 days    Posterior tibialis muscle dysfunction  -     naproxen (NAPROSYN) 500 MG tablet; Take 1 tablet (500 mg) by mouth 2 times daily (with meals) for 10 days    Metatarsalgia of left foot  -     Vitamin D Deficiency; Future  -     naproxen (NAPROSYN) 500 MG tablet; Take 1 tablet (500 mg) by mouth 2 times daily (with meals) for 10 days    Bursitis of intermetatarsal bursa of left foot      28-year-old female presenting to clinic today with 6 months worth of left lower extremity pain.  The patient has a negative history for low bone mineral density or stress injuries given the lack of history of previous stress reactions, late menses, or irregular menses.  Patient was involved in sports in high school, but did not suffer any stress injuries to the lower extremity that she is aware of.  She however has never had a vitamin D level tested.  Her constellation of symptoms do seem as though they are secondary to overuse as well as functional/anatomical disruptions.  The patient does have evidence of medial tibial stress syndrome, this seems secondary to the amount of running and weightlifting as she is doing on a daily basis.  Patient also wears well-fitting shoes at work, to which she tends to address her more wearing heels, which is contributing to the metatarsalgia and intermetatarsal bursitis.  The hyperpronation of the foot is also leading to the diagnosis of posterior tibialis dysfunction.  At this time the patient has not tried any medications.  We have given her a 10-day course of an anti-inflammatory.  We have also provided her a metatarsal bar to wear in her shoes.  I have also recommended for the time being, the next 2 to 4 weeks, the patient remain in the shoes that are more comfortable for her while working, working out, and ambulating.  I do not  think the patient needs a cam boot or soft soled postop shoe at this time.  The bigger recommendation now would be to cut her high impact strength training activities by 50%.  I have also advised the patient that she should refrain from running completely for the next 3 to 4 weeks.  She can crosstraining with low impact activities such as the elliptical, swimming, or bicycling.  But any high impact repetitive cardio activity should be avoided.  Did also talk with the patient if her pain is persisting, I do think a cam boot and eventual orthotics would be the next option.  I do think the patient will get better over the next 3 to 4 weeks with these modalities.  If she is having refractory issues, patient should return to clinic for repeat evaluation and further discussion on next steps in management.  ER, urgent care, and return precautions have been advised    Options for treatment and/or follow-up care were reviewed with the patient was actively involved in the decision making process. Patient verbalized understanding and was in agreement with the plan.    Lloyd Chaney DO  , Sports Medicine  Department of Family Medicine and [facility]

## 2023-12-27 NOTE — TELEPHONE ENCOUNTER
DIAGNOSIS: Left foot pain and numbness     APPOINTMENT DATE: 12.28.23   NOTES STATUS DETAILS   MEDICATION LIST Internal

## 2023-12-28 ENCOUNTER — OFFICE VISIT (OUTPATIENT)
Dept: ORTHOPEDICS | Facility: CLINIC | Age: 28
End: 2023-12-28
Payer: COMMERCIAL

## 2023-12-28 ENCOUNTER — LAB (OUTPATIENT)
Dept: LAB | Facility: CLINIC | Age: 28
End: 2023-12-28
Payer: COMMERCIAL

## 2023-12-28 ENCOUNTER — PRE VISIT (OUTPATIENT)
Dept: ORTHOPEDICS | Facility: CLINIC | Age: 28
End: 2023-12-28

## 2023-12-28 DIAGNOSIS — M77.42 METATARSALGIA OF LEFT FOOT: ICD-10-CM

## 2023-12-28 DIAGNOSIS — S86.892A LEFT MEDIAL TIBIAL STRESS SYNDROME, INITIAL ENCOUNTER: Primary | ICD-10-CM

## 2023-12-28 DIAGNOSIS — M77.52 BURSITIS OF INTERMETATARSAL BURSA OF LEFT FOOT: ICD-10-CM

## 2023-12-28 DIAGNOSIS — S86.892A LEFT MEDIAL TIBIAL STRESS SYNDROME, INITIAL ENCOUNTER: ICD-10-CM

## 2023-12-28 DIAGNOSIS — R39.9 URINARY SYMPTOM OR SIGN: ICD-10-CM

## 2023-12-28 DIAGNOSIS — M76.829 POSTERIOR TIBIALIS MUSCLE DYSFUNCTION: ICD-10-CM

## 2023-12-28 LAB — VIT D+METAB SERPL-MCNC: 51 NG/ML (ref 20–50)

## 2023-12-28 PROCEDURE — 36415 COLL VENOUS BLD VENIPUNCTURE: CPT | Performed by: PATHOLOGY

## 2023-12-28 PROCEDURE — 82306 VITAMIN D 25 HYDROXY: CPT | Performed by: STUDENT IN AN ORGANIZED HEALTH CARE EDUCATION/TRAINING PROGRAM

## 2023-12-28 PROCEDURE — 99204 OFFICE O/P NEW MOD 45 MIN: CPT | Performed by: STUDENT IN AN ORGANIZED HEALTH CARE EDUCATION/TRAINING PROGRAM

## 2023-12-28 PROCEDURE — 99000 SPECIMEN HANDLING OFFICE-LAB: CPT | Performed by: PATHOLOGY

## 2023-12-28 RX ORDER — NAPROXEN 500 MG/1
500 TABLET ORAL 2 TIMES DAILY WITH MEALS
Qty: 20 TABLET | Refills: 0 | Status: SHIPPED | OUTPATIENT
Start: 2023-12-28 | End: 2024-01-07

## 2023-12-28 NOTE — LETTER
12/28/2023      RE: Benita Robbins  [address]  [place] [address]     Dear Colleague,    Thank you for referring your patient, Benita Robbins, to the [facility] [place]. Please see a copy of my visit note below.    [facility]  Sports Medicine Clinic  Clinics and Surgery Center           SUBJECTIVE       Benita Robbins is a 28 year old female presenting to clinic today. Today, the patient reports that she has had left foot pain for about 6 months after wearing heels and unsupportive shoes. The pain is located over her MT heads. She has been icing and wearing tennis shoes as well as house shoes. Pain has worsened over time. Denies any injuries. She will notice some numbness with prolonged weightbearing activities or wearing flat or heels. She has tried ibuprofen prn.     Background:   Occupation: Insurance  Hand Dominance (If pertinent): NA    Injury (Y/N): No  Work Comp (Y/N): No  Date of injury: None  Mechanism of Injury: No injury, after wearing unsupportive shoes    Duration of symptoms: 6 months   Intensity (1-10): 8   Aggravating factors: flats/heels   Relieving Factors: rest, ice and tennis shoes   Prior Evaluation: No   Previous Surgery on the area (Y/N): No   Physical Therapy (Previous/Current/None): No    Physical Activity/Exercise (What, How Often): Run, every day and strength training      PMH, Medications and Allergies were reviewed and updated as needed.    ROS:  As noted above otherwise negative.    Patient Active Problem List   Diagnosis    Generalized anxiety disorder    Hypercholesterolemia with hypertriglyceridemia    Acne, unspecified acne type    BMI 27.0-27.9,adult    Hyperinsulinemia    Metabolic syndrome X    Dyspareunia in female    Pelvic pain in female    Urinary frequency    Urgency incontinence    Elevated lipoprotein(a)    Left-sided low back pain with left-sided sciatica       Current Outpatient Medications    Medication Sig Dispense Refill    drospirenone-ethinyl estradiol (MELISSA) 3-0.02 MG tablet Take 1 tablet by mouth daily 84 tablet 3    LORazepam (ATIVAN) 0.5 MG tablet Take 1 tablet (0.5 mg) by mouth every 8 hours as needed for anxiety 15 tablet 0    naproxen (NAPROSYN) 500 MG tablet Take 1 tablet (500 mg) by mouth 2 times daily (with meals) for 10 days 20 tablet 0    propranolol (INDERAL) 40 MG tablet TAKE 1 TABLET(40 MG) BY MOUTH EVERY 12 HOURS AS NEEDED FOR ANXIETY 60 tablet 1    spironolactone (ALDACTONE) 100 MG tablet Take 1 tablet (100 mg) by mouth daily      traZODone (DESYREL) 50 MG tablet Take 0.5-1 tablets (25-50 mg) by mouth at bedtime 30 tablet 4    busPIRone (BUSPAR) 5 MG tablet Take 1 tablet (5 mg) by mouth 2 times daily for 7 days, THEN 2 tablets (10 mg) 2 times daily for 7 days, THEN 3 tablets (15 mg) 2 times daily for 14 days. 126 tablet 0    citalopram (CELEXA) 10 MG tablet Start 5mg daily for 1 week then increase to 1 tablet (10mg) daily 30 tablet 1            OBJECTIVE:       Vitals: There were no vitals filed for this visit.  BMI: There is no height or weight on file to calculate BMI.    Gen:  Well nourished and in no acute distress  HEENT: Extraocular movement intact  Neck: Supple  Pulm:  Breathing Comfortably. No increased respiratory effort.  Psych: Euthymic. Appropriately answers questions    MSK: Left lower extremity without evidence of edema or ecchymosis.  Pending evaluation does show external rotation of the bilateral feet, bilateral pes planus, and hyperpronation of the left foot.  Tenderness to palpation at the lateral cuneiform, medial tibial region from 1 cm above the medial malleolus extending roughly 7 cm proximally, at the intermetatarsal regions of the third through fifth phalanx, and the fourth and fifth metatarsal heads.  Full active and passive range of motion of the left ankle.  Strength is 5 out of 5 in all directions of the left ankle as well as knee.  Negative anterior  drawer, talar tilt, and external rotation.  Negative tib-fib squeeze, although painful on the medial side while pressing.              ASSESSMENT and PLAN:     Benita was seen today for pain.    Diagnoses and all orders for this visit:    Left medial tibial stress syndrome, initial encounter  -     Vitamin D Deficiency; Future  -     naproxen (NAPROSYN) 500 MG tablet; Take 1 tablet (500 mg) by mouth 2 times daily (with meals) for 10 days    Posterior tibialis muscle dysfunction  -     naproxen (NAPROSYN) 500 MG tablet; Take 1 tablet (500 mg) by mouth 2 times daily (with meals) for 10 days    Metatarsalgia of left foot  -     Vitamin D Deficiency; Future  -     naproxen (NAPROSYN) 500 MG tablet; Take 1 tablet (500 mg) by mouth 2 times daily (with meals) for 10 days    Bursitis of intermetatarsal bursa of left foot      28-year-old female presenting to clinic today with 6 months worth of left lower extremity pain.  The patient has a negative history for low bone mineral density or stress injuries given the lack of history of previous stress reactions, late menses, or irregular menses.  Patient was involved in sports in high school, but did not suffer any stress injuries to the lower extremity that she is aware of.  She however has never had a vitamin D level tested.  Her constellation of symptoms do seem as though they are secondary to overuse as well as functional/anatomical disruptions.  The patient does have evidence of medial tibial stress syndrome, this seems secondary to the amount of running and weightlifting as she is doing on a daily basis.  Patient also wears well-fitting shoes at work, to which she tends to address her more wearing heels, which is contributing to the metatarsalgia and intermetatarsal bursitis.  The hyperpronation of the foot is also leading to the diagnosis of posterior tibialis dysfunction.  At this time the patient has not tried any medications.  We have given her a 10-day course of an  anti-inflammatory.  We have also provided her a metatarsal bar to wear in her shoes.  I have also recommended for the time being, the next 2 to 4 weeks, the patient remain in the shoes that are more comfortable for her while working, working out, and ambulating.  I do not think the patient needs a cam boot or soft soled postop shoe at this time.  The bigger recommendation now would be to cut her high impact strength training activities by 50%.  I have also advised the patient that she should refrain from running completely for the next 3 to 4 weeks.  She can crosstraining with low impact activities such as the elliptical, swimming, or bicycling.  But any high impact repetitive cardio activity should be avoided.  Did also talk with the patient if her pain is persisting, I do think a cam boot and eventual orthotics would be the next option.  I do think the patient will get better over the next 3 to 4 weeks with these modalities.  If she is having refractory issues, patient should return to clinic for repeat evaluation and further discussion on next steps in management.  ER, urgent care, and return precautions have been advised    Options for treatment and/or follow-up care were reviewed with the patient was actively involved in the decision making process. Patient verbalized understanding and was in agreement with the plan.    Lloyd Chaney DO  , Sports Medicine  Department of Family Medicine and [facility]

## 2024-01-02 DIAGNOSIS — F41.1 GENERALIZED ANXIETY DISORDER: ICD-10-CM

## 2024-01-02 DIAGNOSIS — F41.0 PANIC ATTACK: ICD-10-CM

## 2024-01-02 RX ORDER — LORAZEPAM 0.5 MG/1
0.5 TABLET ORAL EVERY 8 HOURS PRN
Qty: 15 TABLET | Refills: 0 | Status: SHIPPED | OUTPATIENT
Start: 2024-01-02 | End: 2024-05-07

## 2024-02-25 DIAGNOSIS — E66.811 CLASS 1 OBESITY DUE TO EXCESS CALORIES WITH SERIOUS COMORBIDITY AND BODY MASS INDEX (BMI) OF 32.0 TO 32.9 IN ADULT: ICD-10-CM

## 2024-02-25 DIAGNOSIS — R23.2 FLUSHING: ICD-10-CM

## 2024-02-25 DIAGNOSIS — E66.09 CLASS 1 OBESITY DUE TO EXCESS CALORIES WITH SERIOUS COMORBIDITY AND BODY MASS INDEX (BMI) OF 32.0 TO 32.9 IN ADULT: ICD-10-CM

## 2024-02-25 DIAGNOSIS — E78.2 HYPERCHOLESTEROLEMIA WITH HYPERTRIGLYCERIDEMIA: ICD-10-CM

## 2024-02-27 RX ORDER — PHENTERMINE HYDROCHLORIDE 15 MG/1
CAPSULE ORAL
Qty: 30 CAPSULE | OUTPATIENT
Start: 2024-02-27

## 2024-02-27 RX ORDER — PROPRANOLOL HYDROCHLORIDE 40 MG/1
TABLET ORAL
Qty: 180 TABLET | Refills: 1 | Status: SHIPPED | OUTPATIENT
Start: 2024-02-27

## 2024-03-07 ENCOUNTER — MYC MEDICAL ADVICE (OUTPATIENT)
Dept: FAMILY MEDICINE | Facility: CLINIC | Age: 29
End: 2024-03-07
Payer: COMMERCIAL

## 2024-04-17 ENCOUNTER — VIRTUAL VISIT (OUTPATIENT)
Dept: FAMILY MEDICINE | Facility: CLINIC | Age: 29
End: 2024-04-17
Payer: COMMERCIAL

## 2024-04-17 DIAGNOSIS — F51.04 PSYCHOPHYSIOLOGICAL INSOMNIA: ICD-10-CM

## 2024-04-17 DIAGNOSIS — E66.3 OVERWEIGHT (BMI 25.0-29.9): ICD-10-CM

## 2024-04-17 PROCEDURE — 99214 OFFICE O/P EST MOD 30 MIN: CPT | Mod: 95 | Performed by: FAMILY MEDICINE

## 2024-04-17 RX ORDER — PHENTERMINE HYDROCHLORIDE 30 MG/1
30 CAPSULE ORAL EVERY MORNING
Qty: 30 CAPSULE | Refills: 2 | Status: SHIPPED | OUTPATIENT
Start: 2024-04-17 | End: 2024-10-04

## 2024-04-17 RX ORDER — TRAZODONE HYDROCHLORIDE 50 MG/1
25-50 TABLET, FILM COATED ORAL AT BEDTIME
Qty: 30 TABLET | Refills: 4 | Status: SHIPPED | OUTPATIENT
Start: 2024-04-17

## 2024-04-17 ASSESSMENT — ANXIETY QUESTIONNAIRES
4. TROUBLE RELAXING: SEVERAL DAYS
2. NOT BEING ABLE TO STOP OR CONTROL WORRYING: SEVERAL DAYS
5. BEING SO RESTLESS THAT IT IS HARD TO SIT STILL: NOT AT ALL
GAD7 TOTAL SCORE: 4
IF YOU CHECKED OFF ANY PROBLEMS ON THIS QUESTIONNAIRE, HOW DIFFICULT HAVE THESE PROBLEMS MADE IT FOR YOU TO DO YOUR WORK, TAKE CARE OF THINGS AT HOME, OR GET ALONG WITH OTHER PEOPLE: SOMEWHAT DIFFICULT
7. FEELING AFRAID AS IF SOMETHING AWFUL MIGHT HAPPEN: NOT AT ALL
GAD7 TOTAL SCORE: 4
GAD7 TOTAL SCORE: 4
1. FEELING NERVOUS, ANXIOUS, OR ON EDGE: SEVERAL DAYS
7. FEELING AFRAID AS IF SOMETHING AWFUL MIGHT HAPPEN: NOT AT ALL
8. IF YOU CHECKED OFF ANY PROBLEMS, HOW DIFFICULT HAVE THESE MADE IT FOR YOU TO DO YOUR WORK, TAKE CARE OF THINGS AT HOME, OR GET ALONG WITH OTHER PEOPLE?: SOMEWHAT DIFFICULT
3. WORRYING TOO MUCH ABOUT DIFFERENT THINGS: SEVERAL DAYS
6. BECOMING EASILY ANNOYED OR IRRITABLE: NOT AT ALL

## 2024-04-17 ASSESSMENT — PATIENT HEALTH QUESTIONNAIRE - PHQ9
SUM OF ALL RESPONSES TO PHQ QUESTIONS 1-9: 3
10. IF YOU CHECKED OFF ANY PROBLEMS, HOW DIFFICULT HAVE THESE PROBLEMS MADE IT FOR YOU TO DO YOUR WORK, TAKE CARE OF THINGS AT HOME, OR GET ALONG WITH OTHER PEOPLE: SOMEWHAT DIFFICULT
SUM OF ALL RESPONSES TO PHQ QUESTIONS 1-9: 3

## 2024-04-17 NOTE — PROGRESS NOTES
"Benita is a 29 year old who is being evaluated via a billable video visit.    How would you like to obtain your AVS? MyChart  If the video visit is dropped, the invitation should be resent by: Send to e-mail at: [email or web address]  Will anyone else be joining your video visit? No      Assessment & Plan     Psychophysiological insomnia  Refilled med - uses for insomnia   - traZODone (DESYREL) 50 MG tablet; Take 0.5-1 tablets (25-50 mg) by mouth at bedtime    BMI 27.0-27.9,adult  See below  - phentermine 30 MG capsule; Take 1 capsule (30 mg) by mouth every morning    Overweight (BMI 25.0-29.9)  Overweight with recent weight gain.  Pt took phenterime 30mg last year and had successful results -   Would like to try again - discussed using 3-6 months  - phentermine 30 MG capsule; Take 1 capsule (30 mg) by mouth every morning            BMI  Estimated body mass index is 26.93 kg/m  as calculated from the following:    Height as of 10/6/23: 1.759 m (5' 9.25\").    Weight as of 10/6/23: 83.3 kg (183 lb 11.2 oz).   Weight management plan: Discussed healthy diet and exercise guidelines          Subjective   Benita is a 29 year old, presenting for the following health issues:  No chief complaint on file.    History of Present Illness       Reason for visit:  Weight    She eats 4 or more servings of fruits and vegetables daily.She consumes 0 sweetened beverage(s) daily.She exercises with enough effort to increase her heart rate 60 or more minutes per day.  She exercises with enough effort to increase her heart rate 6 days per week.   She is taking medications regularly.      in February   Looking to buy a house - stressful    Weight - struggling to stay on track for weight loss  Stopped taking the birth control - so not sure if related  Stopped late February   Also stopped citalopram and the buspar - wedding was over and less stressed    Weight around February 178 pounds  Weight today 189  Struggling with " stress so admits to eating different during this time  Turning to quick and easy foods    Tried topiramate but had side effects              Review of Systems  Constitutional, HEENT, cardiovascular, pulmonary, GI, , musculoskeletal, neuro, skin, endocrine and psych systems are negative, except as otherwise noted.      Objective           Vitals:  No vitals were obtained today due to virtual visit.    Physical Exam   GENERAL: alert and no distress  EYES: Eyes grossly normal to inspection.  No discharge or erythema, or obvious scleral/conjunctival abnormalities.  RESP: No audible wheeze, cough, or visible cyanosis.    SKIN: Visible skin clear. No significant rash, abnormal pigmentation or lesions.  NEURO: Cranial nerves grossly intact.  Mentation and speech appropriate for age.  PSYCH: Appropriate affect, tone, and pace of words          Video-Visit Details    Type of service:  Video Visit   Originating Location (pt. Location): Home    Distant Location (provider location):  On-site  Platform used for Video Visit: Aly  Signed Electronically by: Gin Anne DO

## 2024-05-05 DIAGNOSIS — F41.0 PANIC ATTACK: ICD-10-CM

## 2024-05-05 DIAGNOSIS — F41.1 GENERALIZED ANXIETY DISORDER: ICD-10-CM

## 2024-05-07 RX ORDER — LORAZEPAM 0.5 MG/1
0.5 TABLET ORAL EVERY 8 HOURS PRN
Qty: 15 TABLET | Refills: 0 | Status: SHIPPED | OUTPATIENT
Start: 2024-05-07 | End: 2024-09-06

## 2024-05-30 ENCOUNTER — APPOINTMENT (OUTPATIENT)
Dept: URBAN - METROPOLITAN AREA CLINIC 257 | Age: 29
Setting detail: DERMATOLOGY
End: 2024-06-03

## 2024-05-30 DIAGNOSIS — L81.0 POSTINFLAMMATORY HYPERPIGMENTATION: ICD-10-CM

## 2024-05-30 DIAGNOSIS — L70.0 ACNE VULGARIS: ICD-10-CM

## 2024-05-30 PROCEDURE — OTHER ADDITIONAL NOTES: OTHER

## 2024-05-30 PROCEDURE — OTHER COUNSELING: OTHER

## 2024-05-30 PROCEDURE — 99202 OFFICE O/P NEW SF 15 MIN: CPT | Mod: 25

## 2024-05-30 PROCEDURE — OTHER INTRALESIONAL KENALOG: OTHER

## 2024-05-30 PROCEDURE — 11900 INJECT SKIN LESIONS </W 7: CPT

## 2024-05-30 PROCEDURE — OTHER MIPS QUALITY: OTHER

## 2024-05-30 ASSESSMENT — LOCATION SIMPLE DESCRIPTION DERM: LOCATION SIMPLE: LEFT CHEEK

## 2024-05-30 ASSESSMENT — LOCATION DETAILED DESCRIPTION DERM: LOCATION DETAILED: LEFT INFERIOR CENTRAL MALAR CHEEK

## 2024-05-30 ASSESSMENT — LOCATION ZONE DERM: LOCATION ZONE: FACE

## 2024-05-30 NOTE — HPI: ACNE (PATIENT REPORTED)
Where Is Your Acne Located?: Face
Please List The Treatments That Have Worked Best For Your Acne: (Separate Each Entry With A Comma): Patient is currently on 100mg of spironolactone, clindamycin gel, tretinoin 0.05% and this regimen has been very helpful over the years.
Additional Comments (Use Complete Sentences): Patient has been maintaining her acne over the years but recently stopped taking birth control and is now starting to flare. Patient has a large acne cyst on her right cheek she would like to get treated today if possible. Patient heard from a friend that steroid injections are helpful.

## 2024-05-30 NOTE — HPI: CYST
Is This A New Presentation, Or A Follow-Up?: Cyst
Additional History: Patient here for an acne cyst on her right cheek, patient has been getting small flares of acne over the last 2 months after stopping birth control.

## 2024-05-30 NOTE — PROCEDURE: INTRALESIONAL KENALOG
Kenalog Preparation: Kenalog
How Many Mls Were Removed From The 40 Mg/Ml (10ml) Vial When Preparing The Injectable Solution?: 0
Ndc# For Kenalog Only: 8329-5205-40
Bill For Wasted Drug (Kenalog)?: no
Detail Level: Detailed
Medical Necessity Clause: This procedure was medically necessary because the lesions that were treated were:
Total Volume (Ccs): 0.1
Validate Note Data When Using Inventory: Yes
Size Of Lesion (Optional): 0.8
Consent: The risks of atrophy were reviewed with the patient.
Kenalog Type Of Vial: Multiple Dose
Lot # For Kenalog (Optional): [id number]
Administered By (Optional): ANUSHKA
Concentration Of Kenalog Solution Injected (Mg/Ml): 2.5
Show Inventory Tab: Hide
Expiration Date For Kenalog (Optional): 1/2026

## 2024-05-30 NOTE — PROCEDURE: ADDITIONAL NOTES
Render Risk Assessment In Note?: no
Detail Level: Simple
Additional Notes: Patient is currently having her acne prescriptions managed by her PCP, she will let us know if she wants us to take over in the future.

## 2024-05-30 NOTE — PROCEDURE: COUNSELING
Tetracycline Counseling: Patient counseled regarding possible photosensitivity and increased risk for sunburn.  Patient instructed to avoid sunlight, if possible.  When exposed to sunlight, patients should wear protective clothing, sunglasses, and sunscreen.  The patient was instructed to call the office immediately if the following severe adverse effects occur:  hearing changes, easy bruising/bleeding, severe headache, or vision changes.  The patient verbalized understanding of the proper use and possible adverse effects of tetracycline.  All of the patient's questions and concerns were addressed. Patient understands to avoid pregnancy while on therapy due to potential birth defects.
Detail Level: Zone
High Dose Vitamin A Pregnancy And Lactation Text: High dose vitamin A therapy is contraindicated during pregnancy and breast feeding.
Benzoyl Peroxide Pregnancy And Lactation Text: This medication is Pregnancy Category C. It is unknown if benzoyl peroxide is excreted in breast milk.
Doxycycline Counseling:  Patient counseled regarding possible photosensitivity and increased risk for sunburn.  Patient instructed to avoid sunlight, if possible.  When exposed to sunlight, patients should wear protective clothing, sunglasses, and sunscreen.  The patient was instructed to call the office immediately if the following severe adverse effects occur:  hearing changes, easy bruising/bleeding, severe headache, or vision changes.  The patient verbalized understanding of the proper use and possible adverse effects of doxycycline.  All of the patient's questions and concerns were addressed.
Topical Sulfur Applications Counseling: Topical Sulfur Counseling: Patient counseled that this medication may cause skin irritation or allergic reactions.  In the event of skin irritation, the patient was advised to reduce the amount of the drug applied or use it less frequently.   The patient verbalized understanding of the proper use and possible adverse effects of topical sulfur application.  All of the patient's questions and concerns were addressed.
Include Pregnancy/Lactation Warning?: No
Erythromycin Counseling:  I discussed with the patient the risks of erythromycin including but not limited to GI upset, allergic reaction, drug rash, diarrhea, increase in liver enzymes, and yeast infections.
Bactrim Pregnancy And Lactation Text: This medication is Pregnancy Category D and is known to cause fetal risk.  It is also excreted in breast milk.
Minocycline Pregnancy And Lactation Text: This medication is Pregnancy Category D and not consider safe during pregnancy. It is also excreted in breast milk.
Patient Specific Counseling (Will Not Stick From Patient To Patient): Her PCP manages her oral therapy and we discussed that she may need to alter that if she continues to have cysts.  She a agrees and will see PCP
Winlevi Counseling:  I discussed with the patient the risks of topical clascoterone including but not limited to erythema, scaling, itching, and stinging. Patient voiced their understanding.
Aklief counseling:  Patient advised to apply a pea-sized amount only at bedtime and wait 30 minutes after washing their face before applying.  If too drying, patient may add a non-comedogenic moisturizer.  The most commonly reported side effects including irritation, redness, scaling, dryness, stinging, burning, itching, and increased risk of sunburn.  The patient verbalized understanding of the proper use and possible adverse effects of retinoids.  All of the patient's questions and concerns were addressed.
Topical Retinoid Pregnancy And Lactation Text: This medication is Pregnancy Category C. It is unknown if this medication is excreted in breast milk.
Tazorac Counseling:  Patient advised that medication is irritating and drying.  Patient may need to apply sparingly and wash off after an hour before eventually leaving it on overnight.  The patient verbalized understanding of the proper use and possible adverse effects of tazorac.  All of the patient's questions and concerns were addressed.
Aklief Pregnancy And Lactation Text: It is unknown if this medication is safe to use during pregnancy.  It is unknown if this medication is excreted in breast milk.  Breastfeeding women should use the topical cream on the smallest area of the skin for the shortest time needed while breastfeeding.  Do not apply to nipple and areola.
Winlevi Pregnancy And Lactation Text: This medication is considered safe during pregnancy and breastfeeding.
Spironolactone Counseling: Patient advised regarding risks of diarrhea, abdominal pain, hyperkalemia, birth defects (for female patients), liver toxicity and renal toxicity. The patient may need blood work to monitor liver and kidney function and potassium levels while on therapy. The patient verbalized understanding of the proper use and possible adverse effects of spironolactone.  All of the patient's questions and concerns were addressed.
Isotretinoin Pregnancy And Lactation Text: This medication is Pregnancy Category X and is considered extremely dangerous during pregnancy. It is unknown if it is excreted in breast milk.
Azelaic Acid Pregnancy And Lactation Text: This medication is considered safe during pregnancy and breast feeding.
Dapsone Counseling: I discussed with the patient the risks of dapsone including but not limited to hemolytic anemia, agranulocytosis, rashes, methemoglobinemia, kidney failure, peripheral neuropathy, headaches, GI upset, and liver toxicity.  Patients who start dapsone require monitoring including baseline LFTs and weekly CBCs for the first month, then every month thereafter.  The patient verbalized understanding of the proper use and possible adverse effects of dapsone.  All of the patient's questions and concerns were addressed.
Azithromycin Pregnancy And Lactation Text: This medication is considered safe during pregnancy and is also secreted in breast milk.
Topical Clindamycin Counseling: Patient counseled that this medication may cause skin irritation or allergic reactions.  In the event of skin irritation, the patient was advised to reduce the amount of the drug applied or use it less frequently.   The patient verbalized understanding of the proper use and possible adverse effects of clindamycin.  All of the patient's questions and concerns were addressed.
Bactrim Counseling:  I discussed with the patient the risks of sulfa antibiotics including but not limited to GI upset, allergic reaction, drug rash, diarrhea, dizziness, photosensitivity, and yeast infections.  Rarely, more serious reactions can occur including but not limited to aplastic anemia, agranulocytosis, methemoglobinemia, blood dyscrasias, liver or kidney failure, lung infiltrates or desquamative/blistering drug rashes.
Doxycycline Pregnancy And Lactation Text: This medication is Pregnancy Category D and not consider safe during pregnancy. It is also excreted in breast milk but is considered safe for shorter treatment courses.
Minocycline Counseling: Patient advised regarding possible photosensitivity and discoloration of the teeth, skin, lips, tongue and gums.  Patient instructed to avoid sunlight, if possible.  When exposed to sunlight, patients should wear protective clothing, sunglasses, and sunscreen.  The patient was instructed to call the office immediately if the following severe adverse effects occur:  hearing changes, easy bruising/bleeding, severe headache, or vision changes.  The patient verbalized understanding of the proper use and possible adverse effects of minocycline.  All of the patient's questions and concerns were addressed.
Topical Retinoid counseling:  Patient advised to apply a pea-sized amount only at bedtime and wait 30 minutes after washing their face before applying.  If too drying, patient may add a non-comedogenic moisturizer. The patient verbalized understanding of the proper use and possible adverse effects of retinoids.  All of the patient's questions and concerns were addressed.
Topical Sulfur Applications Pregnancy And Lactation Text: This medication is Pregnancy Category C and has an unknown safety profile during pregnancy. It is unknown if this topical medication is excreted in breast milk.
Sarecycline Counseling: Patient advised regarding possible photosensitivity and discoloration of the teeth, skin, lips, tongue and gums.  Patient instructed to avoid sunlight, if possible.  When exposed to sunlight, patients should wear protective clothing, sunglasses, and sunscreen.  The patient was instructed to call the office immediately if the following severe adverse effects occur:  hearing changes, easy bruising/bleeding, severe headache, or vision changes.  The patient verbalized understanding of the proper use and possible adverse effects of sarecycline.  All of the patient's questions and concerns were addressed.
Erythromycin Pregnancy And Lactation Text: This medication is Pregnancy Category B and is considered safe during pregnancy. It is also excreted in breast milk.
Birth Control Pills Counseling: Birth Control Pill Counseling: I discussed with the patient the potential side effects of OCPs including but not limited to increased risk of stroke, heart attack, thrombophlebitis, deep venous thrombosis, hepatic adenomas, breast changes, GI upset, headaches, and depression.  The patient verbalized understanding of the proper use and possible adverse effects of OCPs. All of the patient's questions and concerns were addressed.
Azelaic Acid Counseling: Patient counseled that medicine may cause skin irritation and to avoid applying near the eyes.  In the event of skin irritation, the patient was advised to reduce the amount of the drug applied or use it less frequently.   The patient verbalized understanding of the proper use and possible adverse effects of azelaic acid.  All of the patient's questions and concerns were addressed.
Birth Control Pills Pregnancy And Lactation Text: This medication should be avoided if pregnant and for the first 30 days post-partum.
Isotretinoin Counseling: Patient should get monthly blood tests, not donate blood, not drive at night if vision affected, not share medication, and not undergo elective surgery for 6 months after tx completed. Side effects reviewed, pt to contact office should one occur.
Tazorac Pregnancy And Lactation Text: This medication is not safe during pregnancy. It is unknown if this medication is excreted in breast milk.
High Dose Vitamin A Counseling: Side effects reviewed, pt to contact office should one occur.
Dapsone Pregnancy And Lactation Text: This medication is Pregnancy Category C and is not considered safe during pregnancy or breast feeding.
Benzoyl Peroxide Counseling: Patient counseled that medicine may cause skin irritation and bleach clothing.  In the event of skin irritation, the patient was advised to reduce the amount of the drug applied or use it less frequently.   The patient verbalized understanding of the proper use and possible adverse effects of benzoyl peroxide.  All of the patient's questions and concerns were addressed.
Azithromycin Counseling:  I discussed with the patient the risks of azithromycin including but not limited to GI upset, allergic reaction, drug rash, diarrhea, and yeast infections.
Spironolactone Pregnancy And Lactation Text: This medication can cause feminization of the male fetus and should be avoided during pregnancy. The active metabolite is also found in breast milk.
Topical Clindamycin Pregnancy And Lactation Text: This medication is Pregnancy Category B and is considered safe during pregnancy. It is unknown if it is excreted in breast milk.
Detail Level: Detailed

## 2024-06-19 PROBLEM — M54.42 LEFT-SIDED LOW BACK PAIN WITH LEFT-SIDED SCIATICA: Status: RESOLVED | Noted: 2023-08-28 | Resolved: 2024-06-19

## 2024-06-19 NOTE — PROGRESS NOTES
"    DISCHARGE  Reason for Discharge: Patient has failed to schedule further appointments.    Equipment Issued:     Discharge Plan: Patient to continue home program.   12/04/23 0500   Appointment Info   Signing clinician's name / credentials Sofi Potter PT   Total/Authorized Visits E&T 6, (MD order received for LBP dated 11/28/23- Gin Zuñiga)   Visits Used 5   Medical Diagnosis LBP   PT Tx Diagnosis L LBP /sciatica   Self Referred   Self Referred (PT)   (MD order received for LBP-dated 11/28/23 Gin Anne)   Progress Note/Certification   Start of Care Date 08/28/23   Onset of illness/injury or Date of Surgery 10/01/22   Therapy Frequency 1x week for 4 weeks then 2x month for   Predicted Duration 2 months   Progress Note Completed Date 11/13/23   PT Goal 1   Goal Identifier sitting tolerance   Goal Description Pt will sit for 45 min with PL 1-2   Rationale to maximize safety and independence with performance of ADLs and functional tasks;to maximize safety and independence with self cares;to maximize safety and independence with transportation   Goal Progress goal met- 60' pain free   Target Date 11/26/23   Date Met 12/04/23   Subjective Report   Subjective Report Benita reports continued  good improvement in LB.She denies LBP with ADL's, including longer periods of sitting,even in a plane. She has increased running to 3-4x week -2 miles without LB or L gluteal pain and increased weight training. She does notice some mild L knee pain with exercise- this is not a new problem.   Objective Measures   Objective Measures Objective Measure 1   Objective Measure 1   Objective Measure LROM: FLX hands to feet- pain free, EXT nil loss- pain free, SB B nil loss- painfree.  Increased flexibility B hamstring, mild tightness B ITB.  Good TrA/gluteal activation and stabilization with advanced core and gluteal strengthening exercises. L anterior knee discomfort with 6\" side or forward step. Minimal PT/ muscle tension L " "piriformis, obturator internus or QL.   Treatment Interventions (PT)   Interventions Therapeutic Procedure/Exercise;Manual Therapy;Therapeutic Activity   Therapeutic Procedure/Exercise   Therapeutic Procedures: strength, endurance, ROM, flexibility minutes (26683) 10   Therapeutic Procedures Ther Proc 2;Ther Proc 3;Ther Proc 4;Ther Proc 5;Ther Proc 6;Ther Proc 7;Ther Proc 8;Ther Proc 9;Ther Proc 10   Ther Proc 1 press ups   Ther Proc 1 - Details add Pt OP with exhale   Ther Proc 2 EIS   Ther Proc 2 - Details HEP   Ther Proc 3 clam in SL with TB   Ther Proc 3 - Details rev   Ther Proc 4 hamstring st   Ther Proc 4 - Details HEP   Ther Proc 5 toe tap- core   Ther Proc 5 - Details 5 reps- progress to double toe tap   Ther Proc 6 SLR ABD in SL   Ther Proc 6 - Details HEP   Ther Proc 7 bridge #5   Ther Proc 7 - Details rev   Ther Proc 8 prone arm/leg raise   Ther Proc 8 - Details rev   Patient Response/Progress ninfa well   Ther Proc 9 ITB stretch in supine   Ther Proc 9 - Details 30\" x 2 B   Therapeutic Activity   Therapeutic Activities: dynamic activities to improve functional performance minutes (15416) 10   Therapeutic Activities Ther Act 2;Ther Act 3   Ther Act 1 side step 6\" B, forward step down   Ther Act 1 - Details 10 reps on L(mild L knee + during, no worse)   Ther Act 2 side lunge B   Ther Act 2 - Details 10 reps with good technique   Ther Act 3 monster walk with TB   Ther Act 3 - Details 10 steps alternating   Patient Response/Progress ninfa well- mild L knee pain with decending 6\" step   Manual Therapy   Manual Therapy: Mobilization, MFR, MLD, friction massage minutes (98236) 13   Manual Therapy 1 prone - lumbar PA's, L  MFR piriformis, OI   Patient Response/Progress Decreased PT/ muscle tension L OI, piriformis   Education   Learner/Method Patient;Listening;Pictures/Video   Plan   Home program see PTRX   Updates to plan of care received MD order for LB - dated 11/28/23. Add ITB stretch, decending avery as L knee " tolerates.   Plan for next session Pt goals met- will continue HEP and FU in next 2 months if needed. She may self refer to PT for L knee pain.   Total Session Time   Timed Code Treatment Minutes 33   Total Treatment Time (sum of timed and untimed services) 33         Referring Provider:  Gin Anne

## 2024-07-11 PROBLEM — R10.2 PELVIC PAIN IN FEMALE: Status: RESOLVED | Noted: 2023-01-02 | Resolved: 2024-07-11

## 2024-07-11 PROBLEM — R35.0 URINARY FREQUENCY: Status: RESOLVED | Noted: 2023-01-02 | Resolved: 2024-07-11

## 2024-07-11 PROBLEM — N39.41 URGENCY INCONTINENCE: Status: RESOLVED | Noted: 2023-01-02 | Resolved: 2024-07-11

## 2024-07-28 ENCOUNTER — E-VISIT (OUTPATIENT)
Dept: FAMILY MEDICINE | Facility: CLINIC | Age: 29
End: 2024-07-28
Payer: COMMERCIAL

## 2024-07-28 DIAGNOSIS — F41.1 GENERALIZED ANXIETY DISORDER: Primary | ICD-10-CM

## 2024-07-28 PROCEDURE — 99421 OL DIG E/M SVC 5-10 MIN: CPT | Performed by: FAMILY MEDICINE

## 2024-07-28 ASSESSMENT — PATIENT HEALTH QUESTIONNAIRE - PHQ9
SUM OF ALL RESPONSES TO PHQ QUESTIONS 1-9: 16
10. IF YOU CHECKED OFF ANY PROBLEMS, HOW DIFFICULT HAVE THESE PROBLEMS MADE IT FOR YOU TO DO YOUR WORK, TAKE CARE OF THINGS AT HOME, OR GET ALONG WITH OTHER PEOPLE: EXTREMELY DIFFICULT
SUM OF ALL RESPONSES TO PHQ QUESTIONS 1-9: 16

## 2024-07-28 ASSESSMENT — ANXIETY QUESTIONNAIRES
8. IF YOU CHECKED OFF ANY PROBLEMS, HOW DIFFICULT HAVE THESE MADE IT FOR YOU TO DO YOUR WORK, TAKE CARE OF THINGS AT HOME, OR GET ALONG WITH OTHER PEOPLE?: EXTREMELY DIFFICULT
2. NOT BEING ABLE TO STOP OR CONTROL WORRYING: NEARLY EVERY DAY
5. BEING SO RESTLESS THAT IT IS HARD TO SIT STILL: NEARLY EVERY DAY
6. BECOMING EASILY ANNOYED OR IRRITABLE: NEARLY EVERY DAY
3. WORRYING TOO MUCH ABOUT DIFFERENT THINGS: NEARLY EVERY DAY
IF YOU CHECKED OFF ANY PROBLEMS ON THIS QUESTIONNAIRE, HOW DIFFICULT HAVE THESE PROBLEMS MADE IT FOR YOU TO DO YOUR WORK, TAKE CARE OF THINGS AT HOME, OR GET ALONG WITH OTHER PEOPLE: EXTREMELY DIFFICULT
7. FEELING AFRAID AS IF SOMETHING AWFUL MIGHT HAPPEN: NEARLY EVERY DAY
GAD7 TOTAL SCORE: 21
1. FEELING NERVOUS, ANXIOUS, OR ON EDGE: NEARLY EVERY DAY
GAD7 TOTAL SCORE: 21
GAD7 TOTAL SCORE: 21
4. TROUBLE RELAXING: NEARLY EVERY DAY
7. FEELING AFRAID AS IF SOMETHING AWFUL MIGHT HAPPEN: NEARLY EVERY DAY

## 2024-07-29 RX ORDER — SERTRALINE HYDROCHLORIDE 25 MG/1
25 TABLET, FILM COATED ORAL DAILY
Qty: 30 TABLET | Refills: 1 | Status: SHIPPED | OUTPATIENT
Start: 2024-07-29 | End: 2024-10-04

## 2024-07-29 ASSESSMENT — PATIENT HEALTH QUESTIONNAIRE - PHQ9: SUM OF ALL RESPONSES TO PHQ QUESTIONS 1-9: 16

## 2024-07-29 NOTE — TELEPHONE ENCOUNTER
Provider E-Visit time total (minutes):     Spoke to the pt    She would like to try increasing the buspar to 15 mg twice daily and will then try adding sertraline 25 mg and follow up in a month with PCP    Not sure if

## 2024-09-04 DIAGNOSIS — F41.0 PANIC ATTACK: ICD-10-CM

## 2024-09-04 DIAGNOSIS — F41.1 GENERALIZED ANXIETY DISORDER: ICD-10-CM

## 2024-09-05 ENCOUNTER — MYC REFILL (OUTPATIENT)
Dept: FAMILY MEDICINE | Facility: CLINIC | Age: 29
End: 2024-09-05
Payer: COMMERCIAL

## 2024-09-05 DIAGNOSIS — F41.1 GENERALIZED ANXIETY DISORDER: ICD-10-CM

## 2024-09-05 DIAGNOSIS — F41.0 PANIC ATTACK: ICD-10-CM

## 2024-09-06 RX ORDER — LORAZEPAM 0.5 MG/1
0.5 TABLET ORAL EVERY 8 HOURS PRN
Qty: 15 TABLET | Refills: 0 | Status: SHIPPED | OUTPATIENT
Start: 2024-09-06

## 2024-09-06 RX ORDER — LORAZEPAM 0.5 MG/1
0.5 TABLET ORAL EVERY 8 HOURS PRN
Qty: 15 TABLET | Refills: 0 | OUTPATIENT
Start: 2024-09-06

## 2024-09-11 ENCOUNTER — TRANSFERRED RECORDS (OUTPATIENT)
Dept: HEALTH INFORMATION MANAGEMENT | Facility: CLINIC | Age: 29
End: 2024-09-11

## 2024-09-11 LAB — PHQ9 SCORE: 14

## 2024-10-02 ASSESSMENT — ANXIETY QUESTIONNAIRES
6. BECOMING EASILY ANNOYED OR IRRITABLE: SEVERAL DAYS
GAD7 TOTAL SCORE: 6
1. FEELING NERVOUS, ANXIOUS, OR ON EDGE: SEVERAL DAYS
5. BEING SO RESTLESS THAT IT IS HARD TO SIT STILL: SEVERAL DAYS
3. WORRYING TOO MUCH ABOUT DIFFERENT THINGS: SEVERAL DAYS
7. FEELING AFRAID AS IF SOMETHING AWFUL MIGHT HAPPEN: NOT AT ALL
GAD7 TOTAL SCORE: 6
7. FEELING AFRAID AS IF SOMETHING AWFUL MIGHT HAPPEN: NOT AT ALL
2. NOT BEING ABLE TO STOP OR CONTROL WORRYING: SEVERAL DAYS
4. TROUBLE RELAXING: SEVERAL DAYS
IF YOU CHECKED OFF ANY PROBLEMS ON THIS QUESTIONNAIRE, HOW DIFFICULT HAVE THESE PROBLEMS MADE IT FOR YOU TO DO YOUR WORK, TAKE CARE OF THINGS AT HOME, OR GET ALONG WITH OTHER PEOPLE: EXTREMELY DIFFICULT
8. IF YOU CHECKED OFF ANY PROBLEMS, HOW DIFFICULT HAVE THESE MADE IT FOR YOU TO DO YOUR WORK, TAKE CARE OF THINGS AT HOME, OR GET ALONG WITH OTHER PEOPLE?: EXTREMELY DIFFICULT
GAD7 TOTAL SCORE: 6

## 2024-10-04 ENCOUNTER — VIRTUAL VISIT (OUTPATIENT)
Dept: FAMILY MEDICINE | Facility: CLINIC | Age: 29
End: 2024-10-04
Payer: COMMERCIAL

## 2024-10-04 DIAGNOSIS — F90.0 ATTENTION DEFICIT HYPERACTIVITY DISORDER (ADHD), PREDOMINANTLY INATTENTIVE TYPE: Primary | ICD-10-CM

## 2024-10-04 DIAGNOSIS — F41.1 GENERALIZED ANXIETY DISORDER: ICD-10-CM

## 2024-10-04 PROCEDURE — 99214 OFFICE O/P EST MOD 30 MIN: CPT | Mod: 95 | Performed by: FAMILY MEDICINE

## 2024-10-04 RX ORDER — BUSPIRONE HYDROCHLORIDE 15 MG/1
15 TABLET ORAL 2 TIMES DAILY
COMMUNITY
Start: 2024-10-04

## 2024-10-04 RX ORDER — DEXTROAMPHETAMINE SACCHARATE, AMPHETAMINE ASPARTATE MONOHYDRATE, DEXTROAMPHETAMINE SULFATE AND AMPHETAMINE SULFATE 2.5; 2.5; 2.5; 2.5 MG/1; MG/1; MG/1; MG/1
10 CAPSULE, EXTENDED RELEASE ORAL DAILY
Qty: 30 CAPSULE | Refills: 0 | Status: SHIPPED | OUTPATIENT
Start: 2024-10-04 | End: 2024-10-07

## 2024-10-04 ASSESSMENT — PATIENT HEALTH QUESTIONNAIRE - PHQ9
SUM OF ALL RESPONSES TO PHQ QUESTIONS 1-9: 8
SUM OF ALL RESPONSES TO PHQ QUESTIONS 1-9: 8
10. IF YOU CHECKED OFF ANY PROBLEMS, HOW DIFFICULT HAVE THESE PROBLEMS MADE IT FOR YOU TO DO YOUR WORK, TAKE CARE OF THINGS AT HOME, OR GET ALONG WITH OTHER PEOPLE: EXTREMELY DIFFICULT

## 2024-10-04 NOTE — PROGRESS NOTES
Benita is a 29 year old who is being evaluated via a billable video visit.    How would you like to obtain your AVS? MyChart  If the video visit is dropped, the invitation should be resent by:   Will anyone else be joining your video visit? No      Assessment & Plan     Attention deficit hyperactivity disorder (ADHD), predominantly inattentive type  Newly diagnosed ADHD  Reviewed comprehensive psychological assessment completed at [facility]  Discussed medication options -   Reviewed potential side effects   Discussed follow-up in early December for physical -   Can refill one more month if helpful - or adjust up to 15mg if needed   - amphetamine-dextroamphetamine (ADDERALL XR) 10 MG 24 hr capsule; Take 1 capsule (10 mg) by mouth daily.    Generalized anxiety disorder  On buspar from online provider  Will hold off on adjustments since starting above med            Subjective   Benita is a 29 year old, presenting for the following health issues:  No chief complaint on file.    History of Present Illness       Mental Health Follow-up:  Patient presents to follow-up on Anxiety.    Patient's anxiety since last visit has been:  Medium  The patient is having other symptoms associated with anxiety.  Any significant life events: No  Patient is feeling anxious or having panic attacks.  Patient has no concerns about alcohol or drug use.    She eats 4 or more servings of fruits and vegetables daily.She consumes 0 sweetened beverage(s) daily.She exercises with enough effort to increase her heart rate 30 to 60 minutes per day.  She exercises with enough effort to increase her heart rate 6 days per week.   She is taking medications regularly.     Here therapist had recommended getting tested  Was having bad anxiety             Review of Systems  Constitutional, HEENT, cardiovascular, pulmonary, GI, , musculoskeletal, neuro, skin, endocrine and psych systems are negative, except as otherwise noted.       Objective           Vitals:  No vitals were obtained today due to virtual visit.    Physical Exam   GENERAL: alert and no distress  EYES: Eyes grossly normal to inspection.  No discharge or erythema, or obvious scleral/conjunctival abnormalities.  RESP: No audible wheeze, cough, or visible cyanosis.    SKIN: Visible skin clear. No significant rash, abnormal pigmentation or lesions.  NEURO: Cranial nerves grossly intact.  Mentation and speech appropriate for age.  PSYCH: Appropriate affect, tone, and pace of words          Video-Visit Details    Type of service:  Video Visit   Originating Location (pt. Location): Home    Distant Location (provider location):  On-site  Platform used for Video Visit: Aly  Signed Electronically by: Gin Anne DO

## 2024-10-07 ENCOUNTER — MYC MEDICAL ADVICE (OUTPATIENT)
Dept: FAMILY MEDICINE | Facility: CLINIC | Age: 29
End: 2024-10-07
Payer: COMMERCIAL

## 2024-10-07 DIAGNOSIS — F90.0 ATTENTION DEFICIT HYPERACTIVITY DISORDER (ADHD), PREDOMINANTLY INATTENTIVE TYPE: ICD-10-CM

## 2024-10-08 RX ORDER — DEXTROAMPHETAMINE SACCHARATE, AMPHETAMINE ASPARTATE MONOHYDRATE, DEXTROAMPHETAMINE SULFATE AND AMPHETAMINE SULFATE 2.5; 2.5; 2.5; 2.5 MG/1; MG/1; MG/1; MG/1
10 CAPSULE, EXTENDED RELEASE ORAL DAILY
Qty: 90 CAPSULE | Refills: 0 | Status: SHIPPED | OUTPATIENT
Start: 2024-10-08

## 2024-11-06 ENCOUNTER — E-VISIT (OUTPATIENT)
Dept: FAMILY MEDICINE | Facility: CLINIC | Age: 29
End: 2024-11-06
Payer: COMMERCIAL

## 2024-11-06 DIAGNOSIS — F90.0 ATTENTION DEFICIT HYPERACTIVITY DISORDER (ADHD), PREDOMINANTLY INATTENTIVE TYPE: Primary | ICD-10-CM

## 2024-11-06 DIAGNOSIS — M79.672 LEFT FOOT PAIN: Primary | ICD-10-CM

## 2024-11-06 PROCEDURE — 99207 PR NON-BILLABLE SERV PER CHARTING: CPT | Performed by: FAMILY MEDICINE

## 2024-11-06 RX ORDER — DEXTROAMPHETAMINE SACCHARATE, AMPHETAMINE ASPARTATE MONOHYDRATE, DEXTROAMPHETAMINE SULFATE AND AMPHETAMINE SULFATE 3.75; 3.75; 3.75; 3.75 MG/1; MG/1; MG/1; MG/1
15 CAPSULE, EXTENDED RELEASE ORAL DAILY
Qty: 90 CAPSULE | Refills: 0 | Status: SHIPPED | OUTPATIENT
Start: 2024-11-06

## 2024-11-06 ASSESSMENT — ANXIETY QUESTIONNAIRES
8. IF YOU CHECKED OFF ANY PROBLEMS, HOW DIFFICULT HAVE THESE MADE IT FOR YOU TO DO YOUR WORK, TAKE CARE OF THINGS AT HOME, OR GET ALONG WITH OTHER PEOPLE?: SOMEWHAT DIFFICULT
2. NOT BEING ABLE TO STOP OR CONTROL WORRYING: SEVERAL DAYS
7. FEELING AFRAID AS IF SOMETHING AWFUL MIGHT HAPPEN: NOT AT ALL
GAD7 TOTAL SCORE: 7
1. FEELING NERVOUS, ANXIOUS, OR ON EDGE: MORE THAN HALF THE DAYS
7. FEELING AFRAID AS IF SOMETHING AWFUL MIGHT HAPPEN: NOT AT ALL
5. BEING SO RESTLESS THAT IT IS HARD TO SIT STILL: SEVERAL DAYS
3. WORRYING TOO MUCH ABOUT DIFFERENT THINGS: SEVERAL DAYS
GAD7 TOTAL SCORE: 7
IF YOU CHECKED OFF ANY PROBLEMS ON THIS QUESTIONNAIRE, HOW DIFFICULT HAVE THESE PROBLEMS MADE IT FOR YOU TO DO YOUR WORK, TAKE CARE OF THINGS AT HOME, OR GET ALONG WITH OTHER PEOPLE: SOMEWHAT DIFFICULT
6. BECOMING EASILY ANNOYED OR IRRITABLE: SEVERAL DAYS
4. TROUBLE RELAXING: SEVERAL DAYS
GAD7 TOTAL SCORE: 7

## 2024-11-06 ASSESSMENT — PATIENT HEALTH QUESTIONNAIRE - PHQ9
SUM OF ALL RESPONSES TO PHQ QUESTIONS 1-9: 4
10. IF YOU CHECKED OFF ANY PROBLEMS, HOW DIFFICULT HAVE THESE PROBLEMS MADE IT FOR YOU TO DO YOUR WORK, TAKE CARE OF THINGS AT HOME, OR GET ALONG WITH OTHER PEOPLE: VERY DIFFICULT
SUM OF ALL RESPONSES TO PHQ QUESTIONS 1-9: 4

## 2024-11-06 NOTE — PATIENT INSTRUCTIONS
Thank you for checking in. I have adjusted your medication to manage your symptoms. The following medication was sent to your pharmacy:    Orders Placed This Encounter   Medications     amphetamine-dextroamphetamine (ADDERALL XR) 15 MG 24 hr capsule     Sig: Take 1 capsule (15 mg) by mouth daily.     Dispense:  90 capsule     Refill:  0          View your full visit summary for details by clicking on the link below. Your pharmacist will be able to address any questions you may have about the medication.       Please send an eVisit to follow up on this medication change in 3 weeks, or sooner if needed.     Thank you for choosing us for your care.

## 2024-11-08 NOTE — PROGRESS NOTES
[facility]  Sports Medicine Clinic  [facility]           SUBJECTIVE       Benita Robbins is a 29 year old female presenting to clinic today. Today, the patient reports that she's had chronic left foot pain. She was seen by myself last year on 12/28/23 for the same issue. Pain remains along the MT heads over the plantar aspect. She does note numbness and tingling. She does use metatarsal pads that she uses since her last visit with me. She has pain with yoga and running. She still notes pain with use of flats and high heel use.     Background:   Occupation: Insurance  Hand Dominance (If pertinent): NA    Injury (Y/N): No  Work Comp (Y/N): No  Date of injury: JMAES  Mechanism of Injury: NA    Duration of symptoms: Chronic over 1 year   Intensity (1-10): 4/10   Aggravating factors: weightbearing activities    Relieving Factors: Rest, ice   Prior Evaluation: Yes, myself 2023   Previous Surgery on the area (Y/N): No   Physical Therapy (Previous/Current/None): No    Physical Activity/Exercise (What, How Often): Yoga, running      PMH, Medications and Allergies were reviewed and updated as needed.    ROS:  As noted above otherwise negative.    Patient Active Problem List   Diagnosis    Generalized anxiety disorder    Hypercholesterolemia with hypertriglyceridemia    Acne, unspecified acne type    BMI 27.0-27.9,adult    Hyperinsulinemia    Metabolic syndrome X    Dyspareunia in female    Elevated lipoprotein(a)    Attention deficit hyperactivity disorder (ADHD), predominantly inattentive type       Current Outpatient Medications   Medication Sig Dispense Refill    amphetamine-dextroamphetamine (ADDERALL XR) 10 MG 24 hr capsule Take 1 capsule (10 mg) by mouth daily. 90 capsule 0    amphetamine-dextroamphetamine (ADDERALL XR) 15 MG 24 hr capsule Take 1 capsule (15 mg) by mouth daily. 90 capsule 0    busPIRone (BUSPAR) 15 MG tablet Take 1 tablet (15 mg) by mouth 2 times daily.      LORazepam  (ATIVAN) 0.5 MG tablet TAKE 1 TABLET(0.5 MG) BY MOUTH EVERY 8 HOURS AS NEEDED FOR ANXIETY 15 tablet 0    propranolol (INDERAL) 40 MG tablet TAKE 1 TABLET(40 MG) BY MOUTH EVERY 12 HOURS AS NEEDED FOR ANXIETY 180 tablet 1    spironolactone (ALDACTONE) 100 MG tablet Take 1 tablet (100 mg) by mouth daily      traZODone (DESYREL) 50 MG tablet Take 0.5-1 tablets (25-50 mg) by mouth at bedtime 30 tablet 4            OBJECTIVE:       Vitals: There were no vitals filed for this visit.  BMI: There is no height or weight on file to calculate BMI.    Gen:  Well nourished and in no acute distress  HEENT: Extraocular movement intact  Neck: Supple  Pulm:  Breathing Comfortably. No increased respiratory effort.  Psych: Euthymic. Appropriately answers questions    MSK: Left foot without evidence of erythema or ecchymosis.  Asymmetric coolness, which appears normal for the patient, of all of the phalanges when compared to the to the remainder of the foot.  Pulses are intact at 2/4 of the foot and ankle.  Full range of motion of the foot and ankle.  Tenderness to palpation on the plantar aspect of the metatarsal heads, 3 through 5.  Negative talar tilt.  Negative anterior drawer.  Negative tib-fib squeeze.  Negative calcaneal squeeze.      XRAY : Independent x-ray evaluation shows no substantial degenerative changes.  No acute osseous abnormality seen.          ASSESSMENT and PLAN:     Benita was seen today for pain.    Diagnoses and all orders for this visit:    Metatarsalgia of left foot  -     MR Foot Left w/o Contrast; Future      Benita is a 29-year-old female presenting to clinic today with concerns for left metatarsal head pain, on the 3rd through 5th digit.  Patient has had this happen before, and was seen by us a year ago which did resolve with metatarsal pads.  This time, that she does not seem to be having much resolution, and her activities of daily living are causing pain in that area.    Plan:  I have discussed this  at length with the patient.  I have also discussed with the patient her past medical history and any particular concerns for low bone mineral density, when this happened before vitamin D level was appropriate.  The patient's temperature changes in the feet, as well as her stating that some of this has happened in the hands as well, does give concern for potential differential diagnosis of a Raynaud's phenomenon that could be causing some pain, coolness, and paresthesias.  However cannot rule out bony stress injury to the metatarsal head so we have ordered an MRI.  I have also recommended the patient enter into a postop shoe for ambulation comfort.  If she is trying this throughout the weekend and has no substantial relief, she can return to clinic for one of our  visits to get fitted for a walking boot, short.  I will see the patient back either in person or virtually after the MRI has resulted to discuss ongoing management recommendations.  All questions have been answered.  Return precautions advised.    Options for treatment and/or follow-up care were reviewed with the patient was actively involved in the decision making process. Patient verbalized understanding and was in agreement with the plan.    Lloyd Chaney DO  , Sports Medicine  Department of Family Medicine and [facility]

## 2024-11-09 ENCOUNTER — OFFICE VISIT (OUTPATIENT)
Dept: ORTHOPEDICS | Facility: CLINIC | Age: 29
End: 2024-11-09
Payer: COMMERCIAL

## 2024-11-09 ENCOUNTER — ANCILLARY PROCEDURE (OUTPATIENT)
Dept: GENERAL RADIOLOGY | Facility: CLINIC | Age: 29
End: 2024-11-09
Attending: STUDENT IN AN ORGANIZED HEALTH CARE EDUCATION/TRAINING PROGRAM
Payer: COMMERCIAL

## 2024-11-09 DIAGNOSIS — M79.672 LEFT FOOT PAIN: ICD-10-CM

## 2024-11-09 DIAGNOSIS — M77.42 METATARSALGIA OF LEFT FOOT: Primary | ICD-10-CM

## 2024-11-09 PROCEDURE — 73630 X-RAY EXAM OF FOOT: CPT | Mod: LT | Performed by: RADIOLOGY

## 2024-11-09 PROCEDURE — 99213 OFFICE O/P EST LOW 20 MIN: CPT | Performed by: STUDENT IN AN ORGANIZED HEALTH CARE EDUCATION/TRAINING PROGRAM

## 2024-11-09 NOTE — LETTER
11/9/2024      RE: Benita Robbins  [address]  [place] [address]     Dear Colleague,    Thank you for referring your patient, Benita Robbins, to the [facility] [place]. Please see a copy of my visit note below.    [facility]  Sports Medicine Clinic  Clinics and Surgery Center           SUBJECTIVE       Benita Robbins is a 29 year old female presenting to clinic today. Today, the patient reports that she's had chronic left foot pain. She was seen by myself last year on 12/28/23 for the same issue. Pain remains along the MT heads over the plantar aspect. She does note numbness and tingling. She does use metatarsal pads that she uses since her last visit with me. She has pain with yoga and running. She still notes pain with use of flats and high heel use.     Background:   Occupation: Insurance  Hand Dominance (If pertinent): NA    Injury (Y/N): No  Work Comp (Y/N): No  Date of injury: JAMES  Mechanism of Injury: NA    Duration of symptoms: Chronic over 1 year   Intensity (1-10): 4/10   Aggravating factors: weightbearing activities    Relieving Factors: Rest, ice   Prior Evaluation: Yes, myself 2023   Previous Surgery on the area (Y/N): No   Physical Therapy (Previous/Current/None): No    Physical Activity/Exercise (What, How Often): Yoga, running      PMH, Medications and Allergies were reviewed and updated as needed.    ROS:  As noted above otherwise negative.    Patient Active Problem List   Diagnosis     Generalized anxiety disorder     Hypercholesterolemia with hypertriglyceridemia     Acne, unspecified acne type     BMI 27.0-27.9,adult     Hyperinsulinemia     Metabolic syndrome X     Dyspareunia in female     Elevated lipoprotein(a)     Attention deficit hyperactivity disorder (ADHD), predominantly inattentive type       Current Outpatient Medications   Medication Sig Dispense Refill     amphetamine-dextroamphetamine (ADDERALL XR) 10  MG 24 hr capsule Take 1 capsule (10 mg) by mouth daily. 90 capsule 0     amphetamine-dextroamphetamine (ADDERALL XR) 15 MG 24 hr capsule Take 1 capsule (15 mg) by mouth daily. 90 capsule 0     busPIRone (BUSPAR) 15 MG tablet Take 1 tablet (15 mg) by mouth 2 times daily.       LORazepam (ATIVAN) 0.5 MG tablet TAKE 1 TABLET(0.5 MG) BY MOUTH EVERY 8 HOURS AS NEEDED FOR ANXIETY 15 tablet 0     propranolol (INDERAL) 40 MG tablet TAKE 1 TABLET(40 MG) BY MOUTH EVERY 12 HOURS AS NEEDED FOR ANXIETY 180 tablet 1     spironolactone (ALDACTONE) 100 MG tablet Take 1 tablet (100 mg) by mouth daily       traZODone (DESYREL) 50 MG tablet Take 0.5-1 tablets (25-50 mg) by mouth at bedtime 30 tablet 4            OBJECTIVE:       Vitals: There were no vitals filed for this visit.  BMI: There is no height or weight on file to calculate BMI.    Gen:  Well nourished and in no acute distress  HEENT: Extraocular movement intact  Neck: Supple  Pulm:  Breathing Comfortably. No increased respiratory effort.  Psych: Euthymic. Appropriately answers questions    MSK: Left foot without evidence of erythema or ecchymosis.  Asymmetric coolness, which appears normal for the patient, of all of the phalanges when compared to the to the remainder of the foot.  Pulses are intact at 2/4 of the foot and ankle.  Full range of motion of the foot and ankle.  Tenderness to palpation on the plantar aspect of the metatarsal heads, 3 through 5.  Negative talar tilt.  Negative anterior drawer.  Negative tib-fib squeeze.  Negative calcaneal squeeze.      XRAY : Independent x-ray evaluation shows no substantial degenerative changes.  No acute osseous abnormality seen.          ASSESSMENT and PLAN:     Benita was seen today for pain.    Diagnoses and all orders for this visit:    Metatarsalgia of left foot  -     MR Foot Left w/o Contrast; Future      Benita is a 29-year-old female presenting to clinic today with concerns for left metatarsal head pain, on the 3rd  through 5th digit.  Patient has had this happen before, and was seen by us a year ago which did resolve with metatarsal pads.  This time, that she does not seem to be having much resolution, and her activities of daily living are causing pain in that area.    Plan:  I have discussed this at length with the patient.  I have also discussed with the patient her past medical history and any particular concerns for low bone mineral density, when this happened before vitamin D level was appropriate.  The patient's temperature changes in the feet, as well as her stating that some of this has happened in the hands as well, does give concern for potential differential diagnosis of a Raynaud's phenomenon that could be causing some pain, coolness, and paresthesias.  However cannot rule out bony stress injury to the metatarsal head so we have ordered an MRI.  I have also recommended the patient enter into a postop shoe for ambulation comfort.  If she is trying this throughout the weekend and has no substantial relief, she can return to clinic for one of our  visits to get fitted for a walking boot, short.  I will see the patient back either in person or virtually after the MRI has resulted to discuss ongoing management recommendations.  All questions have been answered.  Return precautions advised.    Options for treatment and/or follow-up care were reviewed with the patient was actively involved in the decision making process. Patient verbalized understanding and was in agreement with the plan.    Lloyd Chaney DO  , Sports Medicine  Department of Family Medicine and [facility]      Again, thank you for allowing me to participate in the care of your patient.      Sincerely,    Lloyd Chaney DO

## 2024-11-09 NOTE — PATIENT INSTRUCTIONS
Please call [phone] to schedule your MRI     Please schedule Follow-up with Dr. Chaney at [phone]

## 2024-11-20 ENCOUNTER — TRANSFERRED RECORDS (OUTPATIENT)
Dept: HEALTH INFORMATION MANAGEMENT | Facility: CLINIC | Age: 29
End: 2024-11-20
Payer: COMMERCIAL

## 2024-12-04 ENCOUNTER — DOCUMENTATION ONLY (OUTPATIENT)
Dept: ORTHOPEDICS | Facility: CLINIC | Age: 29
End: 2024-12-04
Payer: COMMERCIAL

## 2024-12-04 DIAGNOSIS — M77.42 METATARSALGIA OF LEFT FOOT: Primary | ICD-10-CM

## 2025-01-18 ENCOUNTER — MYC REFILL (OUTPATIENT)
Dept: FAMILY MEDICINE | Facility: CLINIC | Age: 30
End: 2025-01-18
Payer: COMMERCIAL

## 2025-01-18 DIAGNOSIS — F41.1 GENERALIZED ANXIETY DISORDER: ICD-10-CM

## 2025-01-18 DIAGNOSIS — F41.0 PANIC ATTACK: ICD-10-CM

## 2025-01-20 RX ORDER — LORAZEPAM 0.5 MG/1
0.5 TABLET ORAL EVERY 8 HOURS PRN
Qty: 15 TABLET | Refills: 0 | Status: SHIPPED | OUTPATIENT
Start: 2025-01-20

## 2025-01-28 ASSESSMENT — ANXIETY QUESTIONNAIRES
3. WORRYING TOO MUCH ABOUT DIFFERENT THINGS: SEVERAL DAYS
5. BEING SO RESTLESS THAT IT IS HARD TO SIT STILL: SEVERAL DAYS
GAD7 TOTAL SCORE: 5
7. FEELING AFRAID AS IF SOMETHING AWFUL MIGHT HAPPEN: NOT AT ALL
8. IF YOU CHECKED OFF ANY PROBLEMS, HOW DIFFICULT HAVE THESE MADE IT FOR YOU TO DO YOUR WORK, TAKE CARE OF THINGS AT HOME, OR GET ALONG WITH OTHER PEOPLE?: SOMEWHAT DIFFICULT
7. FEELING AFRAID AS IF SOMETHING AWFUL MIGHT HAPPEN: NOT AT ALL
2. NOT BEING ABLE TO STOP OR CONTROL WORRYING: SEVERAL DAYS
1. FEELING NERVOUS, ANXIOUS, OR ON EDGE: SEVERAL DAYS
4. TROUBLE RELAXING: SEVERAL DAYS
GAD7 TOTAL SCORE: 5
IF YOU CHECKED OFF ANY PROBLEMS ON THIS QUESTIONNAIRE, HOW DIFFICULT HAVE THESE PROBLEMS MADE IT FOR YOU TO DO YOUR WORK, TAKE CARE OF THINGS AT HOME, OR GET ALONG WITH OTHER PEOPLE: SOMEWHAT DIFFICULT
6. BECOMING EASILY ANNOYED OR IRRITABLE: NOT AT ALL
GAD7 TOTAL SCORE: 5

## 2025-01-29 ENCOUNTER — VIRTUAL VISIT (OUTPATIENT)
Dept: FAMILY MEDICINE | Facility: CLINIC | Age: 30
End: 2025-01-29
Payer: COMMERCIAL

## 2025-01-29 DIAGNOSIS — F90.0 ATTENTION DEFICIT HYPERACTIVITY DISORDER (ADHD), PREDOMINANTLY INATTENTIVE TYPE: Primary | ICD-10-CM

## 2025-01-29 PROCEDURE — 98006 SYNCH AUDIO-VIDEO EST MOD 30: CPT | Performed by: FAMILY MEDICINE

## 2025-01-29 RX ORDER — LISDEXAMFETAMINE DIMESYLATE 20 MG/1
20 CAPSULE ORAL EVERY MORNING
Qty: 90 CAPSULE | Refills: 0 | Status: SHIPPED | OUTPATIENT
Start: 2025-01-29

## 2025-01-29 NOTE — PROGRESS NOTES
Benita is a 29 year old who is being evaluated via a billable video visit.    How would you like to obtain your AVS? MyChart  If the video visit is dropped, the invitation should be resent by: Text to cell phone: [phone]  Will anyone else be joining your video visit? No      Assessment & Plan     Attention deficit hyperactivity disorder (ADHD), predominantly inattentive type  Pt currently on adderall 15mg XR - medication works well but seems to wear off after only about 6 hours  Discussed options - will try vyvanse 20mg daily   New Rx sent  Also discussed if not long enough consider Focalin XR   - lisdexamfetamine (VYVANSE) 20 MG capsule; Take 1 capsule (20 mg) by mouth every morning.      Pt is trying to conceive - we reviewed medication and she prefers to be off anything - agree with this plan            Subjective   Benita is a 29 year old, presenting for the following health issues:  No chief complaint on file.    History of Present Illness       Mental Health Follow-up:  Patient presents to follow-up on Anxiety.    Patient's anxiety since last visit has been:  Better  The patient is not having other symptoms associated with anxiety.  Any significant life events: No  Patient is feeling anxious or having panic attacks.  Patient has no concerns about alcohol or drug use.    She eats 2-3 servings of fruits and vegetables daily.She consumes 0 sweetened beverage(s) daily.She exercises with enough effort to increase her heart rate 30 to 60 minutes per day.  She exercises with enough effort to increase her heart rate 6 days per week.   She is taking medications regularly.     ADHD medication -   Taking the medication mostly work days  Takes at 8:30   Will notice onset one hour later   Around 1-2 PM will start to notice it wears off           Review of Systems  Constitutional, HEENT, cardiovascular, pulmonary, GI, , musculoskeletal, neuro, skin, endocrine and psych systems are negative, except as otherwise  noted.      Objective           Vitals:  No vitals were obtained today due to virtual visit.    Physical Exam   GENERAL: alert and no distress  EYES: Eyes grossly normal to inspection.  No discharge or erythema, or obvious scleral/conjunctival abnormalities.  RESP: No audible wheeze, cough, or visible cyanosis.    SKIN: Visible skin clear. No significant rash, abnormal pigmentation or lesions.  NEURO: Cranial nerves grossly intact.  Mentation and speech appropriate for age.  PSYCH: Appropriate affect, tone, and pace of words          Video-Visit Details    Type of service:  Video Visit   Originating Location (pt. Location): Home    Distant Location (provider location):  On-site  Platform used for Video Visit: Aly  Signed Electronically by: Gin Anne DO

## 2025-02-17 ENCOUNTER — PATIENT OUTREACH (OUTPATIENT)
Dept: CARE COORDINATION | Facility: CLINIC | Age: 30
End: 2025-02-17

## 2025-02-25 ENCOUNTER — LAB REQUISITION (OUTPATIENT)
Dept: LAB | Facility: CLINIC | Age: 30
End: 2025-02-25
Payer: COMMERCIAL

## 2025-02-25 DIAGNOSIS — R35.0 FREQUENCY OF MICTURITION: ICD-10-CM

## 2025-02-25 PROCEDURE — 87086 URINE CULTURE/COLONY COUNT: CPT | Mod: ORL | Performed by: ADVANCED PRACTICE MIDWIFE

## 2025-02-27 LAB — BACTERIA UR CULT: NORMAL

## 2025-03-11 ENCOUNTER — LAB REQUISITION (OUTPATIENT)
Dept: LAB | Facility: CLINIC | Age: 30
End: 2025-03-11
Payer: COMMERCIAL

## 2025-03-11 DIAGNOSIS — Z34.90 ENCOUNTER FOR SUPERVISION OF NORMAL PREGNANCY, UNSPECIFIED, UNSPECIFIED TRIMESTER: ICD-10-CM

## 2025-03-11 DIAGNOSIS — O00.01 ABDOMINAL PREGNANCY WITH INTRAUTERINE PREGNANCY: ICD-10-CM

## 2025-03-11 DIAGNOSIS — R23.2 FLUSHING: ICD-10-CM

## 2025-03-11 LAB
ABO + RH BLD: NORMAL
BASOPHILS # BLD AUTO: 0 10E3/UL (ref 0–0.2)
BASOPHILS NFR BLD AUTO: 0 %
BLD GP AB SCN SERPL QL: NEGATIVE
EOSINOPHIL # BLD AUTO: 0.1 10E3/UL (ref 0–0.7)
EOSINOPHIL NFR BLD AUTO: 1 %
ERYTHROCYTE [DISTWIDTH] IN BLOOD BY AUTOMATED COUNT: 12 % (ref 10–15)
EST. AVERAGE GLUCOSE BLD GHB EST-MCNC: 103 MG/DL
HBA1C MFR BLD: 5.2 %
HCT VFR BLD AUTO: 41.6 % (ref 35–47)
HGB BLD-MCNC: 14.2 G/DL (ref 11.7–15.7)
HIV 1+2 AB+HIV1 P24 AG SERPL QL IA: NONREACTIVE
IMM GRANULOCYTES # BLD: 0 10E3/UL
IMM GRANULOCYTES NFR BLD: 0 %
LYMPHOCYTES # BLD AUTO: 1.9 10E3/UL (ref 0.8–5.3)
LYMPHOCYTES NFR BLD AUTO: 24 %
MCH RBC QN AUTO: 32.3 PG (ref 26.5–33)
MCHC RBC AUTO-ENTMCNC: 34.1 G/DL (ref 31.5–36.5)
MCV RBC AUTO: 95 FL (ref 78–100)
MONOCYTES # BLD AUTO: 0.8 10E3/UL (ref 0–1.3)
MONOCYTES NFR BLD AUTO: 9 %
NEUTROPHILS # BLD AUTO: 5.3 10E3/UL (ref 1.6–8.3)
NEUTROPHILS NFR BLD AUTO: 65 %
NRBC # BLD AUTO: 0 10E3/UL
NRBC BLD AUTO-RTO: 0 /100
PLATELET # BLD AUTO: 326 10E3/UL (ref 150–450)
RBC # BLD AUTO: 4.39 10E6/UL (ref 3.8–5.2)
SPECIMEN EXP DATE BLD: NORMAL
WBC # BLD AUTO: 8.1 10E3/UL (ref 4–11)

## 2025-03-11 PROCEDURE — 86803 HEPATITIS C AB TEST: CPT | Mod: ORL | Performed by: MIDWIFE

## 2025-03-11 PROCEDURE — 80081 OBSTETRIC PANEL INC HIV TSTG: CPT | Mod: ORL | Performed by: MIDWIFE

## 2025-03-11 PROCEDURE — 87086 URINE CULTURE/COLONY COUNT: CPT | Mod: ORL | Performed by: MIDWIFE

## 2025-03-11 PROCEDURE — 83036 HEMOGLOBIN GLYCOSYLATED A1C: CPT | Mod: ORL | Performed by: MIDWIFE

## 2025-03-11 PROCEDURE — 87591 N.GONORRHOEAE DNA AMP PROB: CPT | Mod: ORL | Performed by: MIDWIFE

## 2025-03-11 PROCEDURE — 87491 CHLMYD TRACH DNA AMP PROBE: CPT | Mod: ORL | Performed by: MIDWIFE

## 2025-03-12 LAB
C TRACH DNA SPEC QL PROBE+SIG AMP: NEGATIVE
HBV SURFACE AG SERPL QL IA: NONREACTIVE
HCV AB SERPL QL IA: NONREACTIVE
N GONORRHOEA DNA SPEC QL NAA+PROBE: NEGATIVE
RPR SER QL: NONREACTIVE
RUBV IGG SERPL QL IA: 3.6 INDEX
RUBV IGG SERPL QL IA: POSITIVE
SPECIMEN TYPE: NORMAL

## 2025-03-12 RX ORDER — PROPRANOLOL HYDROCHLORIDE 40 MG/1
TABLET ORAL
Qty: 180 TABLET | Refills: 0 | Status: SHIPPED | OUTPATIENT
Start: 2025-03-12

## 2025-03-13 LAB — BACTERIA UR CULT: NO GROWTH

## 2025-03-25 ENCOUNTER — TRANSFERRED RECORDS (OUTPATIENT)
Dept: HEALTH INFORMATION MANAGEMENT | Facility: CLINIC | Age: 30
End: 2025-03-25
Payer: COMMERCIAL

## 2025-04-11 ENCOUNTER — TRANSFERRED RECORDS (OUTPATIENT)
Dept: HEALTH INFORMATION MANAGEMENT | Facility: CLINIC | Age: 30
End: 2025-04-11
Payer: COMMERCIAL

## 2025-05-06 ENCOUNTER — LAB REQUISITION (OUTPATIENT)
Dept: LAB | Facility: CLINIC | Age: 30
End: 2025-05-06
Payer: COMMERCIAL

## 2025-05-06 DIAGNOSIS — L65.9 NONSCARRING HAIR LOSS, UNSPECIFIED: ICD-10-CM

## 2025-05-06 DIAGNOSIS — Z13.79 ENCOUNTER FOR OTHER SCREENING FOR GENETIC AND CHROMOSOMAL ANOMALIES: ICD-10-CM

## 2025-05-06 LAB — TSH SERPL DL<=0.005 MIU/L-ACNC: 1.23 UIU/ML (ref 0.3–4.2)

## 2025-07-29 ENCOUNTER — LAB REQUISITION (OUTPATIENT)
Dept: LAB | Facility: CLINIC | Age: 30
End: 2025-07-29
Payer: COMMERCIAL

## 2025-07-29 DIAGNOSIS — O26.899 OTHER SPECIFIED PREGNANCY RELATED CONDITIONS, UNSPECIFIED TRIMESTER: ICD-10-CM

## 2025-07-29 DIAGNOSIS — Z36.89 ENCOUNTER FOR OTHER SPECIFIED ANTENATAL SCREENING: ICD-10-CM

## 2025-07-29 DIAGNOSIS — Z67.91 UNSPECIFIED BLOOD TYPE, RH NEGATIVE: ICD-10-CM

## 2025-07-29 LAB
BLD GP AB SCN SERPL QL: NEGATIVE
ERYTHROCYTE [DISTWIDTH] IN BLOOD BY AUTOMATED COUNT: 12.7 % (ref 10–15)
HCT VFR BLD AUTO: 40.8 % (ref 35–47)
HGB BLD-MCNC: 13.2 G/DL (ref 11.7–15.7)
MCH RBC QN AUTO: 30.9 PG (ref 26.5–33)
MCHC RBC AUTO-ENTMCNC: 32.4 G/DL (ref 31.5–36.5)
MCV RBC AUTO: 96 FL (ref 78–100)
PLATELET # BLD AUTO: 298 10E3/UL (ref 150–450)
RBC # BLD AUTO: 4.27 10E6/UL (ref 3.8–5.2)
SPECIMEN EXP DATE BLD: NORMAL
WBC # BLD AUTO: 8.9 10E3/UL (ref 4–11)

## 2025-07-30 LAB — RPR SER QL: NONREACTIVE

## 2025-08-14 ENCOUNTER — LAB REQUISITION (OUTPATIENT)
Dept: LAB | Facility: CLINIC | Age: 30
End: 2025-08-14
Payer: COMMERCIAL